# Patient Record
Sex: FEMALE | Race: AMERICAN INDIAN OR ALASKA NATIVE | NOT HISPANIC OR LATINO | Employment: FULL TIME | RURAL
[De-identification: names, ages, dates, MRNs, and addresses within clinical notes are randomized per-mention and may not be internally consistent; named-entity substitution may affect disease eponyms.]

---

## 2021-08-18 ENCOUNTER — HOSPITAL ENCOUNTER (INPATIENT)
Facility: HOSPITAL | Age: 41
LOS: 3 days | Discharge: ANOTHER HOSPITAL | DRG: 682 | End: 2021-08-21
Attending: INTERNAL MEDICINE | Admitting: INTERNAL MEDICINE
Payer: MEDICARE

## 2021-08-18 DIAGNOSIS — J81.1 PULMONARY EDEMA: ICD-10-CM

## 2021-08-18 DIAGNOSIS — R07.9 CHEST PAIN: ICD-10-CM

## 2021-08-18 DIAGNOSIS — I31.39 PERICARDIAL EFFUSION WITH CARDIAC TAMPONADE: Primary | ICD-10-CM

## 2021-08-18 DIAGNOSIS — E11.22 TYPE 2 DIABETES MELLITUS WITH STAGE 5 CHRONIC KIDNEY DISEASE NOT ON CHRONIC DIALYSIS, WITHOUT LONG-TERM CURRENT USE OF INSULIN: ICD-10-CM

## 2021-08-18 DIAGNOSIS — Z99.2 TYPE 2 DIABETES MELLITUS WITH CHRONIC KIDNEY DISEASE ON CHRONIC DIALYSIS, WITHOUT LONG-TERM CURRENT USE OF INSULIN: ICD-10-CM

## 2021-08-18 DIAGNOSIS — I31.39 PERICARDIAL EFFUSION: ICD-10-CM

## 2021-08-18 DIAGNOSIS — N18.5 TYPE 2 DIABETES MELLITUS WITH STAGE 5 CHRONIC KIDNEY DISEASE NOT ON CHRONIC DIALYSIS, WITHOUT LONG-TERM CURRENT USE OF INSULIN: ICD-10-CM

## 2021-08-18 DIAGNOSIS — E11.22 TYPE 2 DIABETES MELLITUS WITH CHRONIC KIDNEY DISEASE ON CHRONIC DIALYSIS, WITHOUT LONG-TERM CURRENT USE OF INSULIN: ICD-10-CM

## 2021-08-18 DIAGNOSIS — A41.9 SEPSIS DUE TO PNEUMONIA: ICD-10-CM

## 2021-08-18 DIAGNOSIS — R79.89 ELEVATED TROPONIN I LEVEL: ICD-10-CM

## 2021-08-18 DIAGNOSIS — I31.4 PERICARDIAL EFFUSION WITH CARDIAC TAMPONADE: Primary | ICD-10-CM

## 2021-08-18 DIAGNOSIS — N18.6 TYPE 2 DIABETES MELLITUS WITH CHRONIC KIDNEY DISEASE ON CHRONIC DIALYSIS, WITHOUT LONG-TERM CURRENT USE OF INSULIN: ICD-10-CM

## 2021-08-18 DIAGNOSIS — J18.9 SEPSIS DUE TO PNEUMONIA: ICD-10-CM

## 2021-08-18 LAB — TROPONIN I SERPL-MCNC: 0.37 NG/ML

## 2021-08-18 PROCEDURE — 36415 COLL VENOUS BLD VENIPUNCTURE: CPT | Performed by: INTERNAL MEDICINE

## 2021-08-18 PROCEDURE — 84484 ASSAY OF TROPONIN QUANT: CPT | Performed by: INTERNAL MEDICINE

## 2021-08-18 PROCEDURE — 11000001 HC ACUTE MED/SURG PRIVATE ROOM

## 2021-08-18 PROCEDURE — 87635 SARS-COV-2 COVID-19 AMP PRB: CPT | Performed by: INTERNAL MEDICINE

## 2021-08-18 PROCEDURE — 25000242 PHARM REV CODE 250 ALT 637 W/ HCPCS: Performed by: INTERNAL MEDICINE

## 2021-08-18 PROCEDURE — 25000003 PHARM REV CODE 250: Performed by: INTERNAL MEDICINE

## 2021-08-18 RX ORDER — NITROGLYCERIN 0.4 MG/1
0.4 TABLET SUBLINGUAL EVERY 5 MIN PRN
Status: DISCONTINUED | OUTPATIENT
Start: 2021-08-18 | End: 2021-08-21 | Stop reason: HOSPADM

## 2021-08-18 RX ORDER — LABETALOL HYDROCHLORIDE 5 MG/ML
20 INJECTION, SOLUTION INTRAVENOUS EVERY 4 HOURS PRN
Status: DISCONTINUED | OUTPATIENT
Start: 2021-08-18 | End: 2021-08-21 | Stop reason: HOSPADM

## 2021-08-18 RX ORDER — MORPHINE SULFATE 2 MG/ML
2 INJECTION, SOLUTION INTRAMUSCULAR; INTRAVENOUS
Status: DISCONTINUED | OUTPATIENT
Start: 2021-08-18 | End: 2021-08-19

## 2021-08-18 RX ADMIN — NITROGLYCERIN 0.4 MG: 0.4 TABLET, ORALLY DISINTEGRATING SUBLINGUAL at 11:08

## 2021-08-18 RX ADMIN — LABETALOL HYDROCHLORIDE 20 MG: 5 INJECTION, SOLUTION INTRAVENOUS at 11:08

## 2021-08-19 PROBLEM — I31.39 PERICARDIAL EFFUSION WITH CARDIAC TAMPONADE: Status: RESOLVED | Noted: 2021-08-19 | Resolved: 2021-08-19

## 2021-08-19 PROBLEM — N18.6 ESRD (END STAGE RENAL DISEASE): Status: ACTIVE | Noted: 2021-08-19

## 2021-08-19 PROBLEM — J18.9 CAP (COMMUNITY ACQUIRED PNEUMONIA): Status: RESOLVED | Noted: 2021-08-19 | Resolved: 2021-08-19

## 2021-08-19 PROBLEM — A41.9 SEPSIS DUE TO PNEUMONIA: Status: ACTIVE | Noted: 2021-08-19

## 2021-08-19 PROBLEM — I25.10 CORONARY ARTERY DISEASE INVOLVING NATIVE HEART: Status: ACTIVE | Noted: 2021-08-19

## 2021-08-19 PROBLEM — I31.4 PERICARDIAL EFFUSION WITH CARDIAC TAMPONADE: Status: RESOLVED | Noted: 2021-08-19 | Resolved: 2021-08-19

## 2021-08-19 PROBLEM — I10 ESSENTIAL HYPERTENSION: Status: ACTIVE | Noted: 2021-08-19

## 2021-08-19 PROBLEM — J18.9 CAP (COMMUNITY ACQUIRED PNEUMONIA): Status: ACTIVE | Noted: 2021-08-19

## 2021-08-19 PROBLEM — E11.9 TYPE 2 DIABETES MELLITUS, WITHOUT LONG-TERM CURRENT USE OF INSULIN: Status: ACTIVE | Noted: 2021-08-19

## 2021-08-19 PROBLEM — I31.39 PERICARDIAL EFFUSION WITH CARDIAC TAMPONADE: Status: ACTIVE | Noted: 2021-08-19

## 2021-08-19 PROBLEM — J18.9 SEPSIS DUE TO PNEUMONIA: Status: ACTIVE | Noted: 2021-08-19

## 2021-08-19 PROBLEM — A41.9 SEPSIS DUE TO PNEUMONIA: Status: RESOLVED | Noted: 2021-08-19 | Resolved: 2021-08-19

## 2021-08-19 PROBLEM — J96.01 ACUTE HYPOXEMIC RESPIRATORY FAILURE: Status: RESOLVED | Noted: 2021-08-19 | Resolved: 2021-08-19

## 2021-08-19 PROBLEM — J96.01 ACUTE HYPOXEMIC RESPIRATORY FAILURE: Status: ACTIVE | Noted: 2021-08-19

## 2021-08-19 PROBLEM — I31.4 PERICARDIAL EFFUSION WITH CARDIAC TAMPONADE: Status: ACTIVE | Noted: 2021-08-19

## 2021-08-19 PROBLEM — I27.20 PULMONARY HTN: Status: ACTIVE | Noted: 2021-08-19

## 2021-08-19 PROBLEM — J18.9 SEPSIS DUE TO PNEUMONIA: Status: RESOLVED | Noted: 2021-08-19 | Resolved: 2021-08-19

## 2021-08-19 PROBLEM — R79.89 ELEVATED TROPONIN I LEVEL: Status: ACTIVE | Noted: 2021-08-19

## 2021-08-19 PROBLEM — R07.1 CHEST PAIN ON BREATHING: Status: ACTIVE | Noted: 2021-08-19

## 2021-08-19 LAB
ALBUMIN SERPL BCP-MCNC: 2.9 G/DL (ref 3.5–5)
ALBUMIN SERPL BCP-MCNC: 3 G/DL (ref 3.5–5)
ALBUMIN/GLOB SERPL: 0.7 {RATIO}
ALP SERPL-CCNC: 132 U/L (ref 37–98)
ALT SERPL W P-5'-P-CCNC: 16 U/L (ref 13–56)
ANION GAP SERPL CALCULATED.3IONS-SCNC: 11 MMOL/L (ref 7–16)
ANION GAP SERPL CALCULATED.3IONS-SCNC: 12 MMOL/L (ref 7–16)
AORTIC ROOT ANNULUS: 2.2 CM
AORTIC VALVE CUSP SEPERATION: 1.63 CM
APTT PPP: 43 SECONDS (ref 25.2–37.3)
AST SERPL W P-5'-P-CCNC: 21 U/L (ref 15–37)
AV INDEX (PROSTH): 0.8
AV MEAN GRADIENT: 6 MMHG
AV PEAK GRADIENT: 12 MMHG
AV VALVE AREA: 1.81 CM2
AV VELOCITY RATIO: 0.88
BASOPHILS # BLD AUTO: 0.02 K/UL (ref 0–0.2)
BASOPHILS NFR BLD AUTO: 0.3 % (ref 0–1)
BILIRUB SERPL-MCNC: 0.8 MG/DL (ref 0–1.2)
BSA FOR ECHO PROCEDURE: 1.74 M2
BUN SERPL-MCNC: 20 MG/DL (ref 7–18)
BUN SERPL-MCNC: 20 MG/DL (ref 7–18)
BUN/CREAT SERPL: 3 (ref 6–20)
BUN/CREAT SERPL: 3 (ref 6–20)
CALCIUM SERPL-MCNC: 8.4 MG/DL (ref 8.5–10.1)
CALCIUM SERPL-MCNC: 8.4 MG/DL (ref 8.5–10.1)
CHLORIDE SERPL-SCNC: 95 MMOL/L (ref 98–107)
CHLORIDE SERPL-SCNC: 95 MMOL/L (ref 98–107)
CO2 SERPL-SCNC: 32 MMOL/L (ref 21–32)
CO2 SERPL-SCNC: 33 MMOL/L (ref 21–32)
CREAT SERPL-MCNC: 7.17 MG/DL (ref 0.55–1.02)
CREAT SERPL-MCNC: 7.19 MG/DL (ref 0.55–1.02)
CRP SERPL-MCNC: 1.5 MG/DL (ref 0–0.8)
CV ECHO LV RWT: 0.76 CM
DIFFERENTIAL METHOD BLD: ABNORMAL
DOP CALC AO PEAK VEL: 1.7 M/S
DOP CALC AO VTI: 35 CM
DOP CALC LVOT AREA: 2.3 CM2
DOP CALC LVOT DIAMETER: 1.7 CM
DOP CALC LVOT PEAK VEL: 1.5 M/S
DOP CALC LVOT STROKE VOLUME: 63.52 CM3
DOP CALCLVOT PEAK VEL VTI: 28 CM
E WAVE DECELERATION TIME: 213 MSEC
ECHO EF ESTIMATED: 55 %
ECHO LV POSTERIOR WALL: 1.79 CM (ref 0.6–1.1)
EJECTION FRACTION: 55 %
EOSINOPHIL # BLD AUTO: 0.18 K/UL (ref 0–0.5)
EOSINOPHIL NFR BLD AUTO: 2.7 % (ref 1–4)
ERYTHROCYTE [DISTWIDTH] IN BLOOD BY AUTOMATED COUNT: 15.4 % (ref 11.5–14.5)
EST. AVERAGE GLUCOSE BLD GHB EST-MCNC: 87 MG/DL
FRACTIONAL SHORTENING: 33 % (ref 28–44)
GLOBULIN SER-MCNC: 4.3 G/DL (ref 2–4)
GLUCOSE SERPL-MCNC: 107 MG/DL (ref 70–105)
GLUCOSE SERPL-MCNC: 92 MG/DL (ref 70–105)
GLUCOSE SERPL-MCNC: 98 MG/DL (ref 74–106)
GLUCOSE SERPL-MCNC: 99 MG/DL (ref 74–106)
HBA1C MFR BLD HPLC: 5.2 % (ref 4.5–6.6)
HCG SERUM QUALITATIVE: NEGATIVE
HCT VFR BLD AUTO: 28.4 % (ref 38–47)
HGB BLD-MCNC: 8.7 G/DL (ref 12–16)
IMM GRANULOCYTES # BLD AUTO: 0.1 K/UL (ref 0–0.04)
IMM GRANULOCYTES NFR BLD: 1.5 % (ref 0–0.4)
INR BLD: 0.96 (ref 0.9–1.1)
INTERVENTRICULAR SEPTUM: 1.76 CM (ref 0.6–1.1)
IVC OSTIUM: 1.6 CM
LACTATE SERPL-SCNC: 0.7 MMOL/L (ref 0.4–2)
LACTATE SERPL-SCNC: 0.8 MMOL/L (ref 0.4–2)
LEFT ATRIUM SIZE: 3.5 CM
LEFT INTERNAL DIMENSION IN SYSTOLE: 3.15 CM (ref 2.1–4)
LEFT VENTRICLE MASS INDEX: 225 G/M2
LEFT VENTRICULAR INTERNAL DIMENSION IN DIASTOLE: 4.69 CM (ref 3.5–6)
LEFT VENTRICULAR MASS: 380.25 G
LVOT MG: 5 MMHG
LYMPHOCYTES # BLD AUTO: 0.99 K/UL (ref 1–4.8)
LYMPHOCYTES NFR BLD AUTO: 14.6 % (ref 27–41)
MCH RBC QN AUTO: 30.6 PG (ref 27–31)
MCHC RBC AUTO-ENTMCNC: 30.6 G/DL (ref 32–36)
MCV RBC AUTO: 100 FL (ref 80–96)
METHICILLIN RESISTANT STAPHYLOCOCCUS AUREUS: NEGATIVE
MONOCYTES # BLD AUTO: 0.44 K/UL (ref 0–0.8)
MONOCYTES NFR BLD AUTO: 6.5 % (ref 2–6)
MPC BLD CALC-MCNC: 9.5 FL (ref 9.4–12.4)
MV PEAK E VEL: 1.31 M/S
NEUTROPHILS # BLD AUTO: 5.05 K/UL (ref 1.8–7.7)
NEUTROPHILS NFR BLD AUTO: 74.4 % (ref 53–65)
NRBC # BLD AUTO: 0 X10E3/UL
NRBC, AUTO (.00): 0 %
PHOSPHATE SERPL-MCNC: 5.3 MG/DL (ref 2.5–4.5)
PISA TR MAX VEL: 3.7 M/S
PLATELET # BLD AUTO: 223 K/UL (ref 150–400)
POTASSIUM SERPL-SCNC: 4.2 MMOL/L (ref 3.5–5.1)
POTASSIUM SERPL-SCNC: 4.2 MMOL/L (ref 3.5–5.1)
PROT SERPL-MCNC: 7.2 G/DL (ref 6.4–8.2)
PROTHROMBIN TIME: 12.8 SECONDS (ref 11.7–14.7)
RA MAJOR: 3.7 CM
RA PRESSURE: 3 MMHG
RBC # BLD AUTO: 2.84 M/UL (ref 4.2–5.4)
RIGHT VENTRICULAR END-DIASTOLIC DIMENSION: 4 CM
SARS-COV-2 RNA RESP QL NAA+PROBE: NEGATIVE
SODIUM SERPL-SCNC: 135 MMOL/L (ref 136–145)
SODIUM SERPL-SCNC: 135 MMOL/L (ref 136–145)
T4 SERPL-MCNC: 9.7 ΜG/DL (ref 4.8–13.9)
TR MAX PG: 55 MMHG
TRICUSPID ANNULAR PLANE SYSTOLIC EXCURSION: 2.2 CM
TROPONIN I SERPL-MCNC: 0.34 NG/ML
TROPONIN I SERPL-MCNC: 0.35 NG/ML
TSH SERPL DL<=0.005 MIU/L-ACNC: 5.31 UIU/ML (ref 0.36–3.74)
TV REST PULMONARY ARTERY PRESSURE: 58 MMHG
WBC # BLD AUTO: 6.78 K/UL (ref 4.5–11)

## 2021-08-19 PROCEDURE — 25000003 PHARM REV CODE 250: Performed by: HOSPITALIST

## 2021-08-19 PROCEDURE — 94761 N-INVAS EAR/PLS OXIMETRY MLT: CPT

## 2021-08-19 PROCEDURE — 87641 MR-STAPH DNA AMP PROBE: CPT | Performed by: INTERNAL MEDICINE

## 2021-08-19 PROCEDURE — 83605 ASSAY OF LACTIC ACID: CPT | Performed by: INTERNAL MEDICINE

## 2021-08-19 PROCEDURE — 93010 EKG 12-LEAD: ICD-10-PCS | Mod: ,,, | Performed by: HOSPITALIST

## 2021-08-19 PROCEDURE — 99223 PR INITIAL HOSPITAL CARE,LEVL III: ICD-10-PCS | Mod: ,,, | Performed by: INTERNAL MEDICINE

## 2021-08-19 PROCEDURE — 27000221 HC OXYGEN, UP TO 24 HOURS

## 2021-08-19 PROCEDURE — 84436 ASSAY OF TOTAL THYROXINE: CPT | Performed by: HOSPITALIST

## 2021-08-19 PROCEDURE — 99223 1ST HOSP IP/OBS HIGH 75: CPT | Mod: ,,, | Performed by: INTERNAL MEDICINE

## 2021-08-19 PROCEDURE — 80069 RENAL FUNCTION PANEL: CPT | Performed by: INTERNAL MEDICINE

## 2021-08-19 PROCEDURE — 83036 HEMOGLOBIN GLYCOSYLATED A1C: CPT | Performed by: INTERNAL MEDICINE

## 2021-08-19 PROCEDURE — 84484 ASSAY OF TROPONIN QUANT: CPT | Performed by: INTERNAL MEDICINE

## 2021-08-19 PROCEDURE — 80053 COMPREHEN METABOLIC PANEL: CPT | Performed by: INTERNAL MEDICINE

## 2021-08-19 PROCEDURE — 85730 THROMBOPLASTIN TIME PARTIAL: CPT | Performed by: NURSE PRACTITIONER

## 2021-08-19 PROCEDURE — 84443 ASSAY THYROID STIM HORMONE: CPT | Performed by: HOSPITALIST

## 2021-08-19 PROCEDURE — 84703 CHORIONIC GONADOTROPIN ASSAY: CPT | Performed by: INTERNAL MEDICINE

## 2021-08-19 PROCEDURE — 86140 C-REACTIVE PROTEIN: CPT | Performed by: HOSPITALIST

## 2021-08-19 PROCEDURE — 87040 BLOOD CULTURE FOR BACTERIA: CPT | Performed by: INTERNAL MEDICINE

## 2021-08-19 PROCEDURE — 93005 ELECTROCARDIOGRAM TRACING: CPT

## 2021-08-19 PROCEDURE — 85610 PROTHROMBIN TIME: CPT | Performed by: NURSE PRACTITIONER

## 2021-08-19 PROCEDURE — 36415 COLL VENOUS BLD VENIPUNCTURE: CPT | Performed by: INTERNAL MEDICINE

## 2021-08-19 PROCEDURE — 82962 GLUCOSE BLOOD TEST: CPT

## 2021-08-19 PROCEDURE — 85025 COMPLETE CBC W/AUTO DIFF WBC: CPT | Performed by: STUDENT IN AN ORGANIZED HEALTH CARE EDUCATION/TRAINING PROGRAM

## 2021-08-19 PROCEDURE — S0073 INJECTION, AZTREONAM, 500 MG: HCPCS | Performed by: INTERNAL MEDICINE

## 2021-08-19 PROCEDURE — 80100014 HC HEMODIALYSIS 1:1

## 2021-08-19 PROCEDURE — 93010 ELECTROCARDIOGRAM REPORT: CPT | Mod: ,,, | Performed by: HOSPITALIST

## 2021-08-19 PROCEDURE — 63600175 PHARM REV CODE 636 W HCPCS: Performed by: INTERNAL MEDICINE

## 2021-08-19 PROCEDURE — 25000003 PHARM REV CODE 250: Performed by: INTERNAL MEDICINE

## 2021-08-19 PROCEDURE — 11000001 HC ACUTE MED/SURG PRIVATE ROOM

## 2021-08-19 RX ORDER — ACETAMINOPHEN 325 MG/1
650 TABLET ORAL EVERY 6 HOURS PRN
Status: DISCONTINUED | OUTPATIENT
Start: 2021-08-19 | End: 2021-08-21 | Stop reason: HOSPADM

## 2021-08-19 RX ORDER — GLUCAGON 1 MG
1 KIT INJECTION
Status: DISCONTINUED | OUTPATIENT
Start: 2021-08-19 | End: 2021-08-21 | Stop reason: HOSPADM

## 2021-08-19 RX ORDER — ASPIRIN 81 MG/1
81 TABLET ORAL DAILY
Status: DISCONTINUED | OUTPATIENT
Start: 2021-08-19 | End: 2021-08-21 | Stop reason: HOSPADM

## 2021-08-19 RX ORDER — HYDRALAZINE HYDROCHLORIDE 25 MG/1
25 TABLET, FILM COATED ORAL EVERY 12 HOURS
Status: ON HOLD | COMMUNITY
End: 2021-08-21 | Stop reason: SDUPTHER

## 2021-08-19 RX ORDER — ACETAMINOPHEN 325 MG/1
650 TABLET ORAL EVERY 4 HOURS PRN
Status: DISCONTINUED | OUTPATIENT
Start: 2021-08-19 | End: 2021-08-21 | Stop reason: HOSPADM

## 2021-08-19 RX ORDER — HYDRALAZINE HYDROCHLORIDE 25 MG/1
25 TABLET, FILM COATED ORAL EVERY 12 HOURS
Status: DISCONTINUED | OUTPATIENT
Start: 2021-08-19 | End: 2021-08-21 | Stop reason: HOSPADM

## 2021-08-19 RX ORDER — MORPHINE SULFATE 2 MG/ML
2 INJECTION, SOLUTION INTRAMUSCULAR; INTRAVENOUS EVERY 4 HOURS PRN
Status: DISCONTINUED | OUTPATIENT
Start: 2021-08-19 | End: 2021-08-21 | Stop reason: HOSPADM

## 2021-08-19 RX ORDER — LOSARTAN POTASSIUM 50 MG/1
50 TABLET ORAL DAILY
COMMUNITY
End: 2024-02-16

## 2021-08-19 RX ORDER — SODIUM CHLORIDE 0.9 % (FLUSH) 0.9 %
10 SYRINGE (ML) INJECTION
Status: DISCONTINUED | OUTPATIENT
Start: 2021-08-19 | End: 2021-08-21 | Stop reason: HOSPADM

## 2021-08-19 RX ORDER — IBUPROFEN 200 MG
24 TABLET ORAL
Status: DISCONTINUED | OUTPATIENT
Start: 2021-08-19 | End: 2021-08-19 | Stop reason: RX

## 2021-08-19 RX ORDER — HEPARIN SODIUM 5000 [USP'U]/ML
5000 INJECTION, SOLUTION INTRAVENOUS; SUBCUTANEOUS EVERY 12 HOURS
Status: DISCONTINUED | OUTPATIENT
Start: 2021-08-19 | End: 2021-08-21 | Stop reason: HOSPADM

## 2021-08-19 RX ORDER — COLCHICINE 0.6 MG/1
0.6 TABLET, FILM COATED ORAL 2 TIMES DAILY
Status: DISCONTINUED | OUTPATIENT
Start: 2021-08-19 | End: 2021-08-19

## 2021-08-19 RX ORDER — IBUPROFEN 200 MG
16 TABLET ORAL
Status: DISCONTINUED | OUTPATIENT
Start: 2021-08-19 | End: 2021-08-19 | Stop reason: RX

## 2021-08-19 RX ORDER — HYDROCODONE BITARTRATE AND ACETAMINOPHEN 5; 325 MG/1; MG/1
1 TABLET ORAL EVERY 6 HOURS PRN
Status: DISCONTINUED | OUTPATIENT
Start: 2021-08-19 | End: 2021-08-21 | Stop reason: HOSPADM

## 2021-08-19 RX ORDER — AMLODIPINE BESYLATE 10 MG/1
10 TABLET ORAL DAILY
Status: DISCONTINUED | OUTPATIENT
Start: 2021-08-19 | End: 2021-08-21 | Stop reason: HOSPADM

## 2021-08-19 RX ORDER — SODIUM CHLORIDE 9 MG/ML
INJECTION, SOLUTION INTRAVENOUS
Status: DISPENSED
Start: 2021-08-19 | End: 2021-08-20

## 2021-08-19 RX ORDER — LOSARTAN POTASSIUM 50 MG/1
50 TABLET ORAL DAILY
Status: DISCONTINUED | OUTPATIENT
Start: 2021-08-19 | End: 2021-08-21 | Stop reason: HOSPADM

## 2021-08-19 RX ORDER — INSULIN ASPART 100 [IU]/ML
0-5 INJECTION, SOLUTION INTRAVENOUS; SUBCUTANEOUS
Status: DISCONTINUED | OUTPATIENT
Start: 2021-08-19 | End: 2021-08-21 | Stop reason: HOSPADM

## 2021-08-19 RX ORDER — GABAPENTIN 100 MG/1
100 CAPSULE ORAL 2 TIMES DAILY
Status: ON HOLD | COMMUNITY
End: 2021-08-21 | Stop reason: SDUPTHER

## 2021-08-19 RX ORDER — CLONIDINE HYDROCHLORIDE 0.2 MG/1
0.2 TABLET ORAL 2 TIMES DAILY
COMMUNITY

## 2021-08-19 RX ORDER — AMLODIPINE BESYLATE 10 MG/1
10 TABLET ORAL DAILY
COMMUNITY

## 2021-08-19 RX ORDER — IBUPROFEN 600 MG/1
600 TABLET ORAL EVERY 8 HOURS
Status: DISCONTINUED | OUTPATIENT
Start: 2021-08-19 | End: 2021-08-21 | Stop reason: HOSPADM

## 2021-08-19 RX ORDER — METOPROLOL TARTRATE 25 MG/1
25 TABLET, FILM COATED ORAL 2 TIMES DAILY
Status: DISCONTINUED | OUTPATIENT
Start: 2021-08-19 | End: 2021-08-21 | Stop reason: HOSPADM

## 2021-08-19 RX ORDER — ATORVASTATIN CALCIUM 40 MG/1
40 TABLET, FILM COATED ORAL NIGHTLY
Status: DISCONTINUED | OUTPATIENT
Start: 2021-08-19 | End: 2021-08-21 | Stop reason: HOSPADM

## 2021-08-19 RX ADMIN — ATORVASTATIN CALCIUM 40 MG: 40 TABLET, FILM COATED ORAL at 08:08

## 2021-08-19 RX ADMIN — METOPROLOL TARTRATE 25 MG: 25 TABLET, FILM COATED ORAL at 08:08

## 2021-08-19 RX ADMIN — VANCOMYCIN HYDROCHLORIDE 1500 MG: 5 INJECTION, POWDER, LYOPHILIZED, FOR SOLUTION INTRAVENOUS at 05:08

## 2021-08-19 RX ADMIN — ACETAMINOPHEN 650 MG: 325 TABLET ORAL at 02:08

## 2021-08-19 RX ADMIN — HEPARIN SODIUM 5000 UNITS: 5000 INJECTION INTRAVENOUS; SUBCUTANEOUS at 09:08

## 2021-08-19 RX ADMIN — LOSARTAN POTASSIUM 50 MG: 50 TABLET, FILM COATED ORAL at 12:08

## 2021-08-19 RX ADMIN — LABETALOL HYDROCHLORIDE 20 MG: 5 INJECTION, SOLUTION INTRAVENOUS at 08:08

## 2021-08-19 RX ADMIN — ATORVASTATIN CALCIUM 40 MG: 40 TABLET, FILM COATED ORAL at 04:08

## 2021-08-19 RX ADMIN — IBUPROFEN 600 MG: 600 TABLET, FILM COATED ORAL at 02:08

## 2021-08-19 RX ADMIN — AZTREONAM 2000 MG: 2 INJECTION, POWDER, LYOPHILIZED, FOR SOLUTION INTRAMUSCULAR; INTRAVENOUS at 12:08

## 2021-08-19 RX ADMIN — NITROGLYCERIN 0.4 MG: 0.4 TABLET, ORALLY DISINTEGRATING SUBLINGUAL at 09:08

## 2021-08-19 RX ADMIN — HYDROCODONE BITARTRATE AND ACETAMINOPHEN 1 TABLET: 5; 325 TABLET ORAL at 08:08

## 2021-08-19 RX ADMIN — IBUPROFEN 600 MG: 600 TABLET, FILM COATED ORAL at 09:08

## 2021-08-19 RX ADMIN — COLCHICINE 0.6 MG: 0.6 TABLET, FILM COATED ORAL at 12:08

## 2021-08-19 RX ADMIN — MORPHINE SULFATE 2 MG: 2 INJECTION, SOLUTION INTRAMUSCULAR; INTRAVENOUS at 09:08

## 2021-08-19 RX ADMIN — AZTREONAM 2000 MG: 2 INJECTION, POWDER, LYOPHILIZED, FOR SOLUTION INTRAMUSCULAR; INTRAVENOUS at 04:08

## 2021-08-19 RX ADMIN — HYDRALAZINE HYDROCHLORIDE 25 MG: 25 TABLET ORAL at 08:08

## 2021-08-19 RX ADMIN — HEPARIN SODIUM 5000 UNITS: 5000 INJECTION INTRAVENOUS; SUBCUTANEOUS at 08:08

## 2021-08-19 RX ADMIN — AMLODIPINE BESYLATE 10 MG: 10 TABLET ORAL at 12:08

## 2021-08-19 RX ADMIN — ASPIRIN 81 MG: 81 TABLET, COATED ORAL at 08:08

## 2021-08-19 RX ADMIN — TOBRAMYCIN SULFATE 280 MG: 40 INJECTION, SOLUTION INTRAMUSCULAR; INTRAVENOUS at 06:08

## 2021-08-19 RX ADMIN — LABETALOL HYDROCHLORIDE 20 MG: 5 INJECTION, SOLUTION INTRAVENOUS at 11:08

## 2021-08-19 RX ADMIN — MORPHINE SULFATE 2 MG: 2 INJECTION, SOLUTION INTRAMUSCULAR; INTRAVENOUS at 01:08

## 2021-08-20 LAB
ALBUMIN SERPL BCP-MCNC: 2.7 G/DL (ref 3.5–5)
ALBUMIN SERPL BCP-MCNC: 2.7 G/DL (ref 3.5–5)
ALBUMIN/GLOB SERPL: 0.6 {RATIO}
ALP SERPL-CCNC: 127 U/L (ref 37–98)
ALT SERPL W P-5'-P-CCNC: 21 U/L (ref 13–56)
ANION GAP SERPL CALCULATED.3IONS-SCNC: 14 MMOL/L (ref 7–16)
ANION GAP SERPL CALCULATED.3IONS-SCNC: 14 MMOL/L (ref 7–16)
AST SERPL W P-5'-P-CCNC: 35 U/L (ref 15–37)
BASOPHILS # BLD AUTO: 0.04 K/UL (ref 0–0.2)
BASOPHILS NFR BLD AUTO: 0.7 % (ref 0–1)
BILIRUB SERPL-MCNC: 0.7 MG/DL (ref 0–1.2)
BUN SERPL-MCNC: 13 MG/DL (ref 7–18)
BUN SERPL-MCNC: 13 MG/DL (ref 7–18)
BUN/CREAT SERPL: 3 (ref 6–20)
BUN/CREAT SERPL: 3 (ref 6–20)
CALCIUM SERPL-MCNC: 8.2 MG/DL (ref 8.5–10.1)
CALCIUM SERPL-MCNC: 8.2 MG/DL (ref 8.5–10.1)
CHLORIDE SERPL-SCNC: 99 MMOL/L (ref 98–107)
CHLORIDE SERPL-SCNC: 99 MMOL/L (ref 98–107)
CO2 SERPL-SCNC: 27 MMOL/L (ref 21–32)
CO2 SERPL-SCNC: 27 MMOL/L (ref 21–32)
CREAT SERPL-MCNC: 5.02 MG/DL (ref 0.55–1.02)
CREAT SERPL-MCNC: 5.02 MG/DL (ref 0.55–1.02)
DIFFERENTIAL METHOD BLD: ABNORMAL
EOSINOPHIL # BLD AUTO: 0.14 K/UL (ref 0–0.5)
EOSINOPHIL NFR BLD AUTO: 2.4 % (ref 1–4)
ERYTHROCYTE [DISTWIDTH] IN BLOOD BY AUTOMATED COUNT: 15.2 % (ref 11.5–14.5)
GLOBULIN SER-MCNC: 4.2 G/DL (ref 2–4)
GLUCOSE SERPL-MCNC: 113 MG/DL (ref 70–105)
GLUCOSE SERPL-MCNC: 129 MG/DL (ref 70–105)
GLUCOSE SERPL-MCNC: 158 MG/DL (ref 70–105)
GLUCOSE SERPL-MCNC: 94 MG/DL (ref 74–106)
GLUCOSE SERPL-MCNC: 94 MG/DL (ref 74–106)
HCT VFR BLD AUTO: 28.3 % (ref 38–47)
HGB BLD-MCNC: 8.4 G/DL (ref 12–16)
IMM GRANULOCYTES # BLD AUTO: 0.16 K/UL (ref 0–0.04)
IMM GRANULOCYTES NFR BLD: 2.7 % (ref 0–0.4)
LYMPHOCYTES # BLD AUTO: 0.95 K/UL (ref 1–4.8)
LYMPHOCYTES NFR BLD AUTO: 16 % (ref 27–41)
MAGNESIUM SERPL-MCNC: 2.7 MG/DL (ref 1.7–2.3)
MCH RBC QN AUTO: 30.2 PG (ref 27–31)
MCHC RBC AUTO-ENTMCNC: 29.7 G/DL (ref 32–36)
MCV RBC AUTO: 101.8 FL (ref 80–96)
MONOCYTES # BLD AUTO: 0.46 K/UL (ref 0–0.8)
MONOCYTES NFR BLD AUTO: 7.8 % (ref 2–6)
MPC BLD CALC-MCNC: 9.5 FL (ref 9.4–12.4)
NEUTROPHILS # BLD AUTO: 4.18 K/UL (ref 1.8–7.7)
NEUTROPHILS NFR BLD AUTO: 70.4 % (ref 53–65)
NRBC # BLD AUTO: 0 X10E3/UL
NRBC, AUTO (.00): 0 %
PHOSPHATE SERPL-MCNC: 5.3 MG/DL (ref 2.5–4.5)
PLATELET # BLD AUTO: 218 K/UL (ref 150–400)
POTASSIUM SERPL-SCNC: 4.3 MMOL/L (ref 3.5–5.1)
POTASSIUM SERPL-SCNC: 4.3 MMOL/L (ref 3.5–5.1)
PROT SERPL-MCNC: 6.9 G/DL (ref 6.4–8.2)
RBC # BLD AUTO: 2.78 M/UL (ref 4.2–5.4)
SODIUM SERPL-SCNC: 136 MMOL/L (ref 136–145)
SODIUM SERPL-SCNC: 136 MMOL/L (ref 136–145)
TOBRAMYCIN SERPL-MCNC: 4.3 ΜG/ML (ref 0–1.9)
VANCOMYCIN SERPL-MCNC: 27.5 ΜG/ML (ref 0–20)
WBC # BLD AUTO: 5.93 K/UL (ref 4.5–11)

## 2021-08-20 PROCEDURE — 25000003 PHARM REV CODE 250: Performed by: INTERNAL MEDICINE

## 2021-08-20 PROCEDURE — S0073 INJECTION, AZTREONAM, 500 MG: HCPCS | Performed by: HOSPITALIST

## 2021-08-20 PROCEDURE — 82962 GLUCOSE BLOOD TEST: CPT

## 2021-08-20 PROCEDURE — 25000003 PHARM REV CODE 250: Performed by: HOSPITALIST

## 2021-08-20 PROCEDURE — 27000221 HC OXYGEN, UP TO 24 HOURS

## 2021-08-20 PROCEDURE — 36415 PR COLLECTION VENOUS BLOOD,VENIPUNCTURE: ICD-10-PCS | Mod: ,,, | Performed by: CLINICAL MEDICAL LABORATORY

## 2021-08-20 PROCEDURE — 63600175 PHARM REV CODE 636 W HCPCS: Performed by: INTERNAL MEDICINE

## 2021-08-20 PROCEDURE — 36415 COLL VENOUS BLD VENIPUNCTURE: CPT | Performed by: HOSPITALIST

## 2021-08-20 PROCEDURE — 11000001 HC ACUTE MED/SURG PRIVATE ROOM

## 2021-08-20 PROCEDURE — 36415 COLL VENOUS BLD VENIPUNCTURE: CPT | Mod: ,,, | Performed by: CLINICAL MEDICAL LABORATORY

## 2021-08-20 PROCEDURE — 80200 ASSAY OF TOBRAMYCIN: CPT | Performed by: HOSPITALIST

## 2021-08-20 PROCEDURE — 80202 ASSAY OF VANCOMYCIN: CPT | Performed by: HOSPITALIST

## 2021-08-20 PROCEDURE — 85025 COMPLETE CBC W/AUTO DIFF WBC: CPT | Performed by: INTERNAL MEDICINE

## 2021-08-20 PROCEDURE — 80053 COMPREHEN METABOLIC PANEL: CPT | Performed by: INTERNAL MEDICINE

## 2021-08-20 PROCEDURE — 80069 RENAL FUNCTION PANEL: CPT | Performed by: INTERNAL MEDICINE

## 2021-08-20 PROCEDURE — 83735 ASSAY OF MAGNESIUM: CPT | Performed by: INTERNAL MEDICINE

## 2021-08-20 RX ORDER — HYDRALAZINE HYDROCHLORIDE 20 MG/ML
20 INJECTION INTRAMUSCULAR; INTRAVENOUS EVERY 8 HOURS PRN
Status: DISCONTINUED | OUTPATIENT
Start: 2021-08-20 | End: 2021-08-21 | Stop reason: HOSPADM

## 2021-08-20 RX ORDER — GUAIFENESIN/DEXTROMETHORPHAN 100-10MG/5
10 SYRUP ORAL EVERY 4 HOURS PRN
Status: DISCONTINUED | OUTPATIENT
Start: 2021-08-20 | End: 2021-08-21 | Stop reason: HOSPADM

## 2021-08-20 RX ADMIN — HEPARIN SODIUM 5000 UNITS: 5000 INJECTION INTRAVENOUS; SUBCUTANEOUS at 09:08

## 2021-08-20 RX ADMIN — HYDRALAZINE HYDROCHLORIDE 25 MG: 25 TABLET ORAL at 08:08

## 2021-08-20 RX ADMIN — HEPARIN SODIUM 5000 UNITS: 5000 INJECTION INTRAVENOUS; SUBCUTANEOUS at 08:08

## 2021-08-20 RX ADMIN — ASPIRIN 81 MG: 81 TABLET, COATED ORAL at 08:08

## 2021-08-20 RX ADMIN — METOPROLOL TARTRATE 25 MG: 25 TABLET, FILM COATED ORAL at 08:08

## 2021-08-20 RX ADMIN — HYDROCODONE BITARTRATE AND ACETAMINOPHEN 1 TABLET: 5; 325 TABLET ORAL at 08:08

## 2021-08-20 RX ADMIN — LOSARTAN POTASSIUM 50 MG: 50 TABLET, FILM COATED ORAL at 08:08

## 2021-08-20 RX ADMIN — IBUPROFEN 600 MG: 600 TABLET, FILM COATED ORAL at 05:08

## 2021-08-20 RX ADMIN — AMLODIPINE BESYLATE 10 MG: 10 TABLET ORAL at 08:08

## 2021-08-20 RX ADMIN — AZTREONAM 2000 MG: 1 INJECTION, POWDER, LYOPHILIZED, FOR SOLUTION INTRAMUSCULAR; INTRAVENOUS at 01:08

## 2021-08-20 RX ADMIN — ATORVASTATIN CALCIUM 40 MG: 40 TABLET, FILM COATED ORAL at 08:08

## 2021-08-20 RX ADMIN — IBUPROFEN 600 MG: 600 TABLET, FILM COATED ORAL at 08:08

## 2021-08-20 RX ADMIN — HYDRALAZINE HYDROCHLORIDE 20 MG: 20 INJECTION INTRAMUSCULAR; INTRAVENOUS at 10:08

## 2021-08-20 RX ADMIN — HYDRALAZINE HYDROCHLORIDE 20 MG: 20 INJECTION INTRAMUSCULAR; INTRAVENOUS at 02:08

## 2021-08-20 RX ADMIN — GUAIFENESIN AND DEXTROMETHORPHAN 10 ML: 100; 10 SYRUP ORAL at 08:08

## 2021-08-20 RX ADMIN — IBUPROFEN 600 MG: 600 TABLET, FILM COATED ORAL at 01:08

## 2021-08-20 RX ADMIN — GUAIFENESIN AND DEXTROMETHORPHAN 10 ML: 100; 10 SYRUP ORAL at 10:08

## 2021-08-21 VITALS
WEIGHT: 158.31 LBS | RESPIRATION RATE: 18 BRPM | SYSTOLIC BLOOD PRESSURE: 206 MMHG | HEIGHT: 60 IN | TEMPERATURE: 98 F | BODY MASS INDEX: 31.08 KG/M2 | OXYGEN SATURATION: 96 % | HEART RATE: 66 BPM | DIASTOLIC BLOOD PRESSURE: 86 MMHG

## 2021-08-21 PROBLEM — R79.89 ELEVATED TROPONIN I LEVEL: Status: RESOLVED | Noted: 2021-08-19 | Resolved: 2021-08-21

## 2021-08-21 LAB
ALBUMIN SERPL BCP-MCNC: 2.8 G/DL (ref 3.5–5)
ALBUMIN SERPL BCP-MCNC: 2.8 G/DL (ref 3.5–5)
ALBUMIN/GLOB SERPL: 0.7 {RATIO}
ALP SERPL-CCNC: 141 U/L (ref 37–98)
ALT SERPL W P-5'-P-CCNC: 17 U/L (ref 13–56)
ANION GAP SERPL CALCULATED.3IONS-SCNC: 14 MMOL/L (ref 7–16)
ANION GAP SERPL CALCULATED.3IONS-SCNC: 14 MMOL/L (ref 7–16)
AST SERPL W P-5'-P-CCNC: 27 U/L (ref 15–37)
BASOPHILS # BLD AUTO: 0.03 K/UL (ref 0–0.2)
BASOPHILS NFR BLD AUTO: 0.4 % (ref 0–1)
BILIRUB SERPL-MCNC: 0.6 MG/DL (ref 0–1.2)
BUN SERPL-MCNC: 22 MG/DL (ref 7–18)
BUN SERPL-MCNC: 22 MG/DL (ref 7–18)
BUN/CREAT SERPL: 3 (ref 6–20)
BUN/CREAT SERPL: 3 (ref 6–20)
CALCIUM SERPL-MCNC: 8.3 MG/DL (ref 8.5–10.1)
CALCIUM SERPL-MCNC: 8.3 MG/DL (ref 8.5–10.1)
CHLORIDE SERPL-SCNC: 99 MMOL/L (ref 98–107)
CHLORIDE SERPL-SCNC: 99 MMOL/L (ref 98–107)
CO2 SERPL-SCNC: 30 MMOL/L (ref 21–32)
CO2 SERPL-SCNC: 30 MMOL/L (ref 21–32)
CREAT SERPL-MCNC: 7.01 MG/DL (ref 0.55–1.02)
CREAT SERPL-MCNC: 7.01 MG/DL (ref 0.55–1.02)
DIFFERENTIAL METHOD BLD: ABNORMAL
EOSINOPHIL # BLD AUTO: 0.16 K/UL (ref 0–0.5)
EOSINOPHIL NFR BLD AUTO: 2 % (ref 1–4)
ERYTHROCYTE [DISTWIDTH] IN BLOOD BY AUTOMATED COUNT: 15.2 % (ref 11.5–14.5)
FOLATE SERPL-MCNC: 3.5 NG/ML (ref 3.1–17.5)
GLOBULIN SER-MCNC: 4.1 G/DL (ref 2–4)
GLUCOSE SERPL-MCNC: 102 MG/DL (ref 70–105)
GLUCOSE SERPL-MCNC: 104 MG/DL (ref 74–106)
GLUCOSE SERPL-MCNC: 104 MG/DL (ref 74–106)
GLUCOSE SERPL-MCNC: 119 MG/DL (ref 70–105)
HCT VFR BLD AUTO: 27.6 % (ref 38–47)
HGB BLD-MCNC: 8.3 G/DL (ref 12–16)
IMM GRANULOCYTES # BLD AUTO: 0.17 K/UL (ref 0–0.04)
IMM GRANULOCYTES NFR BLD: 2.1 % (ref 0–0.4)
LYMPHOCYTES # BLD AUTO: 1.61 K/UL (ref 1–4.8)
LYMPHOCYTES NFR BLD AUTO: 19.9 % (ref 27–41)
MAGNESIUM SERPL-MCNC: 2.5 MG/DL (ref 1.7–2.3)
MCH RBC QN AUTO: 30.2 PG (ref 27–31)
MCHC RBC AUTO-ENTMCNC: 30.1 G/DL (ref 32–36)
MCV RBC AUTO: 100.4 FL (ref 80–96)
MONOCYTES # BLD AUTO: 0.45 K/UL (ref 0–0.8)
MONOCYTES NFR BLD AUTO: 5.5 % (ref 2–6)
MPC BLD CALC-MCNC: 9.5 FL (ref 9.4–12.4)
NEUTROPHILS # BLD AUTO: 5.69 K/UL (ref 1.8–7.7)
NEUTROPHILS NFR BLD AUTO: 70.1 % (ref 53–65)
NRBC # BLD AUTO: 0.02 X10E3/UL
NRBC, AUTO (.00): 0.2 %
PHOSPHATE SERPL-MCNC: 5.8 MG/DL (ref 2.5–4.5)
PLATELET # BLD AUTO: 221 K/UL (ref 150–400)
POTASSIUM SERPL-SCNC: 5.3 MMOL/L (ref 3.5–5.1)
POTASSIUM SERPL-SCNC: 5.3 MMOL/L (ref 3.5–5.1)
PROT SERPL-MCNC: 6.9 G/DL (ref 6.4–8.2)
RBC # BLD AUTO: 2.75 M/UL (ref 4.2–5.4)
SODIUM SERPL-SCNC: 138 MMOL/L (ref 136–145)
SODIUM SERPL-SCNC: 138 MMOL/L (ref 136–145)
VIT B12 SERPL-MCNC: 491 PG/ML (ref 193–986)
WBC # BLD AUTO: 8.11 K/UL (ref 4.5–11)

## 2021-08-21 PROCEDURE — 94761 N-INVAS EAR/PLS OXIMETRY MLT: CPT

## 2021-08-21 PROCEDURE — 80053 COMPREHEN METABOLIC PANEL: CPT | Performed by: INTERNAL MEDICINE

## 2021-08-21 PROCEDURE — 25000003 PHARM REV CODE 250: Performed by: HOSPITALIST

## 2021-08-21 PROCEDURE — 80069 RENAL FUNCTION PANEL: CPT | Performed by: INTERNAL MEDICINE

## 2021-08-21 PROCEDURE — 86334 PROTEIN ELECTROPHORESIS, SERUM WITH REFLEX IFE: ICD-10-PCS | Mod: ,,, | Performed by: CLINICAL MEDICAL LABORATORY

## 2021-08-21 PROCEDURE — 25000003 PHARM REV CODE 250: Performed by: INTERNAL MEDICINE

## 2021-08-21 PROCEDURE — 86334 IMMUNOFIX E-PHORESIS SERUM: CPT | Performed by: HOSPITALIST

## 2021-08-21 PROCEDURE — 86334 IMMUNOFIX E-PHORESIS SERUM: CPT | Mod: ,,, | Performed by: CLINICAL MEDICAL LABORATORY

## 2021-08-21 PROCEDURE — 85025 COMPLETE CBC W/AUTO DIFF WBC: CPT | Performed by: INTERNAL MEDICINE

## 2021-08-21 PROCEDURE — 63600175 PHARM REV CODE 636 W HCPCS: Performed by: INTERNAL MEDICINE

## 2021-08-21 PROCEDURE — 82607 VITAMIN B-12: CPT | Performed by: HOSPITALIST

## 2021-08-21 PROCEDURE — 83735 ASSAY OF MAGNESIUM: CPT | Performed by: INTERNAL MEDICINE

## 2021-08-21 PROCEDURE — 36415 COLL VENOUS BLD VENIPUNCTURE: CPT | Performed by: INTERNAL MEDICINE

## 2021-08-21 PROCEDURE — 82962 GLUCOSE BLOOD TEST: CPT

## 2021-08-21 RX ORDER — HYDRALAZINE HYDROCHLORIDE 25 MG/1
50 TABLET, FILM COATED ORAL EVERY 8 HOURS
Start: 2021-08-21 | End: 2024-02-16 | Stop reason: DRUGHIGH

## 2021-08-21 RX ORDER — ATORVASTATIN CALCIUM 40 MG/1
40 TABLET, FILM COATED ORAL NIGHTLY
Qty: 90 TABLET | Refills: 3
Start: 2021-08-21 | End: 2024-02-16

## 2021-08-21 RX ORDER — IBUPROFEN 600 MG/1
600 TABLET ORAL EVERY 8 HOURS
Start: 2021-08-21 | End: 2024-02-16 | Stop reason: ALTCHOICE

## 2021-08-21 RX ORDER — GABAPENTIN 100 MG/1
100 CAPSULE ORAL NIGHTLY
Start: 2021-08-21 | End: 2024-02-16 | Stop reason: DRUGHIGH

## 2021-08-21 RX ORDER — ASPIRIN 81 MG/1
81 TABLET ORAL DAILY
Refills: 0
Start: 2021-08-22 | End: 2024-02-16

## 2021-08-21 RX ORDER — METOPROLOL TARTRATE 25 MG/1
25 TABLET, FILM COATED ORAL 2 TIMES DAILY
Qty: 60 TABLET | Refills: 11
Start: 2021-08-21 | End: 2024-02-16

## 2021-08-21 RX ADMIN — GUAIFENESIN AND DEXTROMETHORPHAN 10 ML: 100; 10 SYRUP ORAL at 12:08

## 2021-08-21 RX ADMIN — GUAIFENESIN AND DEXTROMETHORPHAN 10 ML: 100; 10 SYRUP ORAL at 06:08

## 2021-08-21 RX ADMIN — HYDRALAZINE HYDROCHLORIDE 25 MG: 25 TABLET ORAL at 09:08

## 2021-08-21 RX ADMIN — MORPHINE SULFATE 2 MG: 2 INJECTION, SOLUTION INTRAMUSCULAR; INTRAVENOUS at 01:08

## 2021-08-21 RX ADMIN — IBUPROFEN 600 MG: 600 TABLET, FILM COATED ORAL at 06:08

## 2021-08-21 RX ADMIN — HEPARIN SODIUM 5000 UNITS: 5000 INJECTION INTRAVENOUS; SUBCUTANEOUS at 09:08

## 2021-08-21 RX ADMIN — AMLODIPINE BESYLATE 10 MG: 10 TABLET ORAL at 09:08

## 2021-08-21 RX ADMIN — METOPROLOL TARTRATE 25 MG: 25 TABLET, FILM COATED ORAL at 09:08

## 2021-08-21 RX ADMIN — ASPIRIN 81 MG: 81 TABLET, COATED ORAL at 09:08

## 2021-08-21 RX ADMIN — LOSARTAN POTASSIUM 50 MG: 50 TABLET, FILM COATED ORAL at 09:08

## 2021-08-23 LAB
ALBUMIN PE, BLOOD: 3.81 G/DL (ref 3.5–5.2)
ALPHA1 GLOB SERPL ELPH-MCNC: 0.3 G/DL (ref 0.1–0.4)
ALPHA2 GLOB SERPL ELPH-MCNC: 0.7 G/DL (ref 0.4–1.3)
B-GLOBULIN SERPL ELPH-MCNC: 0.5 G/DL (ref 0.5–1.5)
GAMMA GLOB SERPL ELPH-MCNC: 1.2 G/DL (ref 0.5–1.8)
PATH INTERP BLD-IMP: NORMAL
PROT SERPL-MCNC: 6.5 G/DL (ref 6.4–8.2)

## 2021-08-24 LAB
BACTERIA BLD CULT: NORMAL
BACTERIA BLD CULT: NORMAL

## 2023-08-02 DIAGNOSIS — S92.919A: Primary | ICD-10-CM

## 2023-08-17 ENCOUNTER — TELEPHONE (OUTPATIENT)
Dept: ORTHOPEDICS | Facility: CLINIC | Age: 43
End: 2023-08-17
Payer: MEDICARE

## 2024-02-14 ENCOUNTER — HOSPITAL ENCOUNTER (EMERGENCY)
Facility: HOSPITAL | Age: 44
Discharge: HOME | End: 2024-02-14
Payer: MEDICARE

## 2024-02-14 VITALS
TEMPERATURE: 98 F | SYSTOLIC BLOOD PRESSURE: 194 MMHG | HEART RATE: 60 BPM | OXYGEN SATURATION: 96 % | BODY MASS INDEX: 27.29 KG/M2 | HEIGHT: 60 IN | DIASTOLIC BLOOD PRESSURE: 83 MMHG | RESPIRATION RATE: 18 BRPM | WEIGHT: 139 LBS

## 2024-02-14 DIAGNOSIS — L03.90 CELLULITIS, UNSPECIFIED CELLULITIS SITE: Primary | ICD-10-CM

## 2024-02-14 PROCEDURE — 99284 EMERGENCY DEPT VISIT MOD MDM: CPT | Mod: ,,, | Performed by: NURSE PRACTITIONER

## 2024-02-14 PROCEDURE — 96365 THER/PROPH/DIAG IV INF INIT: CPT

## 2024-02-14 PROCEDURE — 99284 EMERGENCY DEPT VISIT MOD MDM: CPT | Mod: 25

## 2024-02-14 PROCEDURE — 63600175 PHARM REV CODE 636 W HCPCS: Mod: JZ,JG | Performed by: NURSE PRACTITIONER

## 2024-02-14 RX ORDER — CLINDAMYCIN HYDROCHLORIDE 150 MG/1
300 CAPSULE ORAL 4 TIMES DAILY
Qty: 56 CAPSULE | Refills: 0 | Status: ON HOLD | OUTPATIENT
Start: 2024-02-14 | End: 2024-02-17 | Stop reason: HOSPADM

## 2024-02-14 RX ORDER — CLINDAMYCIN PHOSPHATE 600 MG/50ML
600 INJECTION, SOLUTION INTRAVENOUS
Status: COMPLETED | OUTPATIENT
Start: 2024-02-14 | End: 2024-02-14

## 2024-02-14 RX ADMIN — CLINDAMYCIN PHOSPHATE 600 MG: 600 INJECTION, SOLUTION INTRAVENOUS at 06:02

## 2024-02-14 NOTE — ED PROVIDER NOTES
Encounter Date: 2/14/2024       History     Chief Complaint   Patient presents with    Wound Infection     44-year-old female presents to ED with complaint of wound infection.  Patient states she was bit on her right ankle on Friday by a spider.  Patient states areas small initially with worsening of the area over the course of several days.  Patient was seen at wound care clinic in [place] and was transferred to ED for further evaluation/IV antibiotic therapy.  Patient denies recent antibiotic treatment for the area.  Patient reports increased swelling and pain to right lower extremity.  Denies chest pain, shortness of breath, fever, chills, nausea/vomiting, diarrhea.    The history is provided by the patient and medical records.     Review of patient's allergies indicates:   Allergen Reactions    Pseudoephedrine Shortness Of Breath and Other (See Comments)    Ampicillin     Penicillins     Sudafed cold-allergy      Past Medical History:   Diagnosis Date    Diabetes mellitus     Dialysis patient     Hypertension     Nerve pain     Renal disorder      Past Surgical History:   Procedure Laterality Date    AV FISTULA PLACEMENT Left      History reviewed. No pertinent family history.  Social History     Tobacco Use    Smoking status: Every Day     Current packs/day: 0.50     Types: Cigarettes    Smokeless tobacco: Never   Substance Use Topics    Drug use: Never     Review of Systems   Constitutional:  Negative for chills and fever.   HENT:  Negative for sinus pressure and sinus pain.    Respiratory:  Negative for cough and shortness of breath.    Cardiovascular:  Positive for leg swelling. Negative for chest pain.   Gastrointestinal:  Negative for nausea and vomiting.   Musculoskeletal:  Positive for arthralgias and joint swelling.   Skin:  Positive for color change and wound.   Neurological:  Negative for dizziness and weakness.   Hematological:  Negative for adenopathy. Does not bruise/bleed easily.    Psychiatric/Behavioral:  Negative for agitation and confusion.    All other systems reviewed and are negative.      Physical Exam     Initial Vitals [02/14/24 1732]   BP Pulse Resp Temp SpO2   (!) 197/85 62 18 98.4 °F (36.9 °C) (!) 93 %      MAP       --         Physical Exam    Nursing note and vitals reviewed.  Constitutional: She appears well-developed and well-nourished.   HENT:   Head: Normocephalic.   Eyes: EOM are normal. Pupils are equal, round, and reactive to light.   Neck: Neck supple.   Normal range of motion.  Cardiovascular:  Normal rate and regular rhythm.           No murmur heard.  Pulmonary/Chest: She has no wheezes. She has no rhonchi.   Abdominal: Abdomen is soft. She exhibits no distension. There is no abdominal tenderness.   Musculoskeletal:         General: Tenderness present. No edema.      Cervical back: Normal range of motion and neck supple.     Lymphadenopathy:     She has no cervical adenopathy.   Neurological: She is alert and oriented to person, place, and time. No cranial nerve deficit or sensory deficit.   Skin: Skin is warm and dry. Capillary refill takes less than 2 seconds.   Psychiatric: She has a normal mood and affect. Thought content normal.         Medical Screening Exam   See Full Note    ED Course   Procedures  Labs Reviewed - No data to display       Imaging Results    None          Medications   clindamycin in D5W 600 mg/50 mL IVPB 600 mg (0 mg Intravenous Stopped 2/14/24 1844)     Medical Decision Making    44-year-old female presents to ED with complaint of wound infection.  Patient states she was bit on her right ankle on Friday by a spider.  Patient states areas small initially with worsening of the area over the course of several days.  Patient was seen at wound care clinic in [place] and was transferred to ED for further evaluation/IV antibiotic therapy.  Patient denies recent antibiotic treatment for the area.  Patient reports increased swelling and pain to  right lower extremity.  Denies chest pain, shortness of breath, fever, chills, nausea/vomiting, diarrhea.    Labs reviewed from clinic; IV Clindamycin administered. Prescription provided    Amount and/or Complexity of Data Reviewed  External Data Reviewed: labs.     Details: CRP 0.6; BUN/Creat 8.83/61, potassium 5.7, sodium 129, chloride 91, WBC 6.8, H&H 8.7/27.7, RBC 2.69, glucose 110    Risk  Prescription drug management.                                      Clinical Impression:   Final diagnoses:  [L03.90] Cellulitis, unspecified cellulitis site (Primary)        ED Disposition Condition    Discharge Stable          ED Prescriptions       Medication Sig Dispense Start Date End Date Auth. Provider    clindamycin (CLEOCIN) 150 MG capsule () Take 2 capsules (300 mg total) by mouth 4 (four) times daily. for 7 days 56 capsule 2024 Jordyn Dangelo, BRIJESH          Follow-up Information    None          Jordyn Dangelo, BRIJESH  24 1444

## 2024-02-14 NOTE — ED NOTES
Patient states that she wears 2L NC at night time and sometimes during the day at home.  Patient O2 saturation 93% on RA .  2L NC applied and pt. O2 sat 97%.

## 2024-02-14 NOTE — ED TRIAGE NOTES
Patient presents to ED via EMS from the wound care clinic in [place] for a wound infection.  Patient states she was bitten by a spider on her right ankle this past Friday.

## 2024-02-15 ENCOUNTER — TELEPHONE (OUTPATIENT)
Dept: EMERGENCY MEDICINE | Facility: HOSPITAL | Age: 44
End: 2024-02-15
Payer: MEDICARE

## 2024-02-16 ENCOUNTER — HOSPITAL ENCOUNTER (OUTPATIENT)
Facility: HOSPITAL | Age: 44
Discharge: HOME | End: 2024-02-17
Attending: EMERGENCY MEDICINE | Admitting: HOSPITALIST
Payer: MEDICARE

## 2024-02-16 DIAGNOSIS — T63.301A SPIDER BITE: Primary | ICD-10-CM

## 2024-02-16 DIAGNOSIS — S81.801A WOUND OF RIGHT LOWER EXTREMITY, INITIAL ENCOUNTER: ICD-10-CM

## 2024-02-16 DIAGNOSIS — N18.6 END-STAGE RENAL DISEASE ON HEMODIALYSIS: ICD-10-CM

## 2024-02-16 DIAGNOSIS — R06.02 SHORTNESS OF BREATH: ICD-10-CM

## 2024-02-16 DIAGNOSIS — I10 ESSENTIAL HYPERTENSION: ICD-10-CM

## 2024-02-16 DIAGNOSIS — Z99.2 END-STAGE RENAL DISEASE ON HEMODIALYSIS: ICD-10-CM

## 2024-02-16 DIAGNOSIS — R07.9 CHEST PAIN: ICD-10-CM

## 2024-02-16 DIAGNOSIS — N18.6 ESRD (END STAGE RENAL DISEASE): ICD-10-CM

## 2024-02-16 DIAGNOSIS — I10 MALIGNANT HYPERTENSION: ICD-10-CM

## 2024-02-16 DIAGNOSIS — T63.301A SPIDER BITE WOUND, ACCIDENTAL OR UNINTENTIONAL, INITIAL ENCOUNTER: ICD-10-CM

## 2024-02-16 DIAGNOSIS — R53.1 WEAKNESS: ICD-10-CM

## 2024-02-16 PROBLEM — G62.9 NEUROPATHY: Status: ACTIVE | Noted: 2024-02-16

## 2024-02-16 PROBLEM — Z74.8 ASSISTANCE NEEDED WITH TRANSPORTATION: Status: RESOLVED | Noted: 2024-02-16 | Resolved: 2024-02-16

## 2024-02-16 PROBLEM — Z74.8 ASSISTANCE NEEDED WITH TRANSPORTATION: Status: ACTIVE | Noted: 2024-02-16

## 2024-02-16 PROBLEM — I16.0 HYPERTENSIVE URGENCY: Status: ACTIVE | Noted: 2021-08-19

## 2024-02-16 PROBLEM — Z91.148 HISTORY OF MEDICATION NONCOMPLIANCE: Status: ACTIVE | Noted: 2024-02-16

## 2024-02-16 LAB
ALBUMIN SERPL BCP-MCNC: 3 G/DL (ref 3.5–5)
ALBUMIN/GLOB SERPL: 0.8 {RATIO}
ALP SERPL-CCNC: 234 U/L (ref 37–98)
ALT SERPL W P-5'-P-CCNC: 20 U/L (ref 13–56)
ANION GAP SERPL CALCULATED.3IONS-SCNC: 21 MMOL/L (ref 7–16)
AST SERPL W P-5'-P-CCNC: 49 U/L (ref 15–37)
BASOPHILS # BLD AUTO: 0.04 K/UL (ref 0–0.2)
BASOPHILS NFR BLD AUTO: 0.7 % (ref 0–1)
BILIRUB SERPL-MCNC: 1.4 MG/DL (ref ?–1.2)
BUN SERPL-MCNC: 76 MG/DL (ref 7–18)
BUN/CREAT SERPL: 8 (ref 6–20)
CALCIUM SERPL-MCNC: 9.5 MG/DL (ref 8.5–10.1)
CHLORIDE SERPL-SCNC: 90 MMOL/L (ref 98–107)
CO2 SERPL-SCNC: 22 MMOL/L (ref 21–32)
CREAT SERPL-MCNC: 9.89 MG/DL (ref 0.55–1.02)
DIFFERENTIAL METHOD BLD: ABNORMAL
EGFR (NO RACE VARIABLE) (RUSH/TITUS): 5 ML/MIN/1.73M2
EOSINOPHIL # BLD AUTO: 0.38 K/UL (ref 0–0.5)
EOSINOPHIL NFR BLD AUTO: 6.9 % (ref 1–4)
ERYTHROCYTE [DISTWIDTH] IN BLOOD BY AUTOMATED COUNT: 16.3 % (ref 11.5–14.5)
GLOBULIN SER-MCNC: 3.8 G/DL (ref 2–4)
GLUCOSE SERPL-MCNC: 68 MG/DL (ref 70–105)
GLUCOSE SERPL-MCNC: 97 MG/DL (ref 74–106)
GLUCOSE SERPL-MCNC: 99 MG/DL (ref 70–105)
HCT VFR BLD AUTO: 24.7 % (ref 38–47)
HGB BLD-MCNC: 8.2 G/DL (ref 12–16)
IMM GRANULOCYTES # BLD AUTO: 0.03 K/UL (ref 0–0.04)
IMM GRANULOCYTES NFR BLD: 0.5 % (ref 0–0.4)
LYMPHOCYTES # BLD AUTO: 0.58 K/UL (ref 1–4.8)
LYMPHOCYTES NFR BLD AUTO: 10.5 % (ref 27–41)
MCH RBC QN AUTO: 33.1 PG (ref 27–31)
MCHC RBC AUTO-ENTMCNC: 33.2 G/DL (ref 32–36)
MCV RBC AUTO: 99.6 FL (ref 80–96)
MONOCYTES # BLD AUTO: 0.4 K/UL (ref 0–0.8)
MONOCYTES NFR BLD AUTO: 7.2 % (ref 2–6)
MPC BLD CALC-MCNC: 9.8 FL (ref 9.4–12.4)
NEUTROPHILS # BLD AUTO: 4.1 K/UL (ref 1.8–7.7)
NEUTROPHILS NFR BLD AUTO: 74.2 % (ref 53–65)
NRBC # BLD AUTO: 0 X10E3/UL
NRBC, AUTO (.00): 0 %
NT-PROBNP SERPL-MCNC: ABNORMAL PG/ML (ref 1–125)
PLATELET # BLD AUTO: 141 K/UL (ref 150–400)
POTASSIUM SERPL-SCNC: 5.9 MMOL/L (ref 3.5–5.1)
PROT SERPL-MCNC: 6.8 G/DL (ref 6.4–8.2)
RBC # BLD AUTO: 2.48 M/UL (ref 4.2–5.4)
SARS-COV-2 RDRP RESP QL NAA+PROBE: NEGATIVE
SODIUM SERPL-SCNC: 127 MMOL/L (ref 136–145)
TROPONIN I SERPL DL<=0.01 NG/ML-MCNC: 45 PG/ML
TROPONIN I SERPL DL<=0.01 NG/ML-MCNC: 50.1 PG/ML
WBC # BLD AUTO: 5.53 K/UL (ref 4.5–11)

## 2024-02-16 PROCEDURE — 83880 ASSAY OF NATRIURETIC PEPTIDE: CPT | Performed by: EMERGENCY MEDICINE

## 2024-02-16 PROCEDURE — 25000003 PHARM REV CODE 250: Performed by: INTERNAL MEDICINE

## 2024-02-16 PROCEDURE — 90935 HEMODIALYSIS ONE EVALUATION: CPT

## 2024-02-16 PROCEDURE — G0257 UNSCHED DIALYSIS ESRD PT HOS: HCPCS

## 2024-02-16 PROCEDURE — 99285 EMERGENCY DEPT VISIT HI MDM: CPT | Mod: 25,,, | Performed by: EMERGENCY MEDICINE

## 2024-02-16 PROCEDURE — 84484 ASSAY OF TROPONIN QUANT: CPT | Performed by: EMERGENCY MEDICINE

## 2024-02-16 PROCEDURE — 87635 SARS-COV-2 COVID-19 AMP PRB: CPT

## 2024-02-16 PROCEDURE — 84484 ASSAY OF TROPONIN QUANT: CPT

## 2024-02-16 PROCEDURE — G0378 HOSPITAL OBSERVATION PER HR: HCPCS

## 2024-02-16 PROCEDURE — 25000003 PHARM REV CODE 250

## 2024-02-16 PROCEDURE — 99223 1ST HOSP IP/OBS HIGH 75: CPT | Mod: AI,,, | Performed by: HOSPITALIST

## 2024-02-16 PROCEDURE — 85025 COMPLETE CBC W/AUTO DIFF WBC: CPT | Performed by: EMERGENCY MEDICINE

## 2024-02-16 PROCEDURE — 63600175 PHARM REV CODE 636 W HCPCS: Performed by: HOSPITALIST

## 2024-02-16 PROCEDURE — 96372 THER/PROPH/DIAG INJ SC/IM: CPT | Mod: 59

## 2024-02-16 PROCEDURE — 10060 I&D ABSCESS SIMPLE/SINGLE: CPT

## 2024-02-16 PROCEDURE — 93005 ELECTROCARDIOGRAM TRACING: CPT

## 2024-02-16 PROCEDURE — 25000003 PHARM REV CODE 250: Performed by: FAMILY MEDICINE

## 2024-02-16 PROCEDURE — 87070 CULTURE OTHR SPECIMN AEROBIC: CPT | Performed by: FAMILY MEDICINE

## 2024-02-16 PROCEDURE — 10060 I&D ABSCESS SIMPLE/SINGLE: CPT | Mod: ,,, | Performed by: EMERGENCY MEDICINE

## 2024-02-16 PROCEDURE — 82962 GLUCOSE BLOOD TEST: CPT

## 2024-02-16 PROCEDURE — 96365 THER/PROPH/DIAG IV INF INIT: CPT

## 2024-02-16 PROCEDURE — 99285 EMERGENCY DEPT VISIT HI MDM: CPT | Mod: 25

## 2024-02-16 PROCEDURE — 93010 ELECTROCARDIOGRAM REPORT: CPT | Mod: ,,, | Performed by: INTERNAL MEDICINE

## 2024-02-16 PROCEDURE — 80053 COMPREHEN METABOLIC PANEL: CPT | Performed by: EMERGENCY MEDICINE

## 2024-02-16 PROCEDURE — 63600175 PHARM REV CODE 636 W HCPCS: Mod: JZ,JG | Performed by: FAMILY MEDICINE

## 2024-02-16 PROCEDURE — 25000003 PHARM REV CODE 250: Performed by: HOSPITALIST

## 2024-02-16 PROCEDURE — 87075 CULTR BACTERIA EXCEPT BLOOD: CPT | Performed by: FAMILY MEDICINE

## 2024-02-16 PROCEDURE — 63600175 PHARM REV CODE 636 W HCPCS

## 2024-02-16 PROCEDURE — 96375 TX/PRO/DX INJ NEW DRUG ADDON: CPT | Mod: 59

## 2024-02-16 RX ORDER — INSULIN ASPART 100 [IU]/ML
0-5 INJECTION, SOLUTION INTRAVENOUS; SUBCUTANEOUS
Status: DISCONTINUED | OUTPATIENT
Start: 2024-02-16 | End: 2024-02-17

## 2024-02-16 RX ORDER — CLONIDINE HYDROCHLORIDE 0.2 MG/1
0.2 TABLET ORAL 2 TIMES DAILY
Status: DISCONTINUED | OUTPATIENT
Start: 2024-02-16 | End: 2024-02-17

## 2024-02-16 RX ORDER — HYDRALAZINE HYDROCHLORIDE 20 MG/ML
10 INJECTION INTRAMUSCULAR; INTRAVENOUS EVERY 6 HOURS PRN
Status: DISCONTINUED | OUTPATIENT
Start: 2024-02-16 | End: 2024-02-16

## 2024-02-16 RX ORDER — GABAPENTIN 100 MG/1
100 CAPSULE ORAL NIGHTLY
Status: DISCONTINUED | OUTPATIENT
Start: 2024-02-16 | End: 2024-02-17 | Stop reason: HOSPADM

## 2024-02-16 RX ORDER — GLUCAGON 1 MG
1 KIT INJECTION
Status: DISCONTINUED | OUTPATIENT
Start: 2024-02-16 | End: 2024-02-17

## 2024-02-16 RX ORDER — HYDROCODONE BITARTRATE AND ACETAMINOPHEN 5; 325 MG/1; MG/1
1 TABLET ORAL EVERY 6 HOURS PRN
Status: DISCONTINUED | OUTPATIENT
Start: 2024-02-16 | End: 2024-02-17

## 2024-02-16 RX ORDER — HEPARIN SODIUM 5000 [USP'U]/ML
5000 INJECTION, SOLUTION INTRAVENOUS; SUBCUTANEOUS EVERY 8 HOURS
Status: DISCONTINUED | OUTPATIENT
Start: 2024-02-16 | End: 2024-02-17 | Stop reason: HOSPADM

## 2024-02-16 RX ORDER — IBUPROFEN 200 MG
16 TABLET ORAL
Status: DISCONTINUED | OUTPATIENT
Start: 2024-02-16 | End: 2024-02-17

## 2024-02-16 RX ORDER — AMLODIPINE BESYLATE 10 MG/1
10 TABLET ORAL DAILY
Status: DISCONTINUED | OUTPATIENT
Start: 2024-02-16 | End: 2024-02-17 | Stop reason: HOSPADM

## 2024-02-16 RX ORDER — ONDANSETRON HYDROCHLORIDE 2 MG/ML
4 INJECTION, SOLUTION INTRAVENOUS
Status: COMPLETED | OUTPATIENT
Start: 2024-02-16 | End: 2024-02-16

## 2024-02-16 RX ORDER — LABETALOL HYDROCHLORIDE 5 MG/ML
10 INJECTION, SOLUTION INTRAVENOUS EVERY 4 HOURS PRN
Status: DISCONTINUED | OUTPATIENT
Start: 2024-02-16 | End: 2024-02-16

## 2024-02-16 RX ORDER — HYDRALAZINE HYDROCHLORIDE 50 MG/1
50 TABLET, FILM COATED ORAL EVERY 8 HOURS
Status: DISCONTINUED | OUTPATIENT
Start: 2024-02-16 | End: 2024-02-17

## 2024-02-16 RX ORDER — IBUPROFEN 200 MG
24 TABLET ORAL
Status: DISCONTINUED | OUTPATIENT
Start: 2024-02-16 | End: 2024-02-17 | Stop reason: HOSPADM

## 2024-02-16 RX ORDER — HYDROCODONE BITARTRATE AND ACETAMINOPHEN 7.5; 325 MG/1; MG/1
1 TABLET ORAL EVERY 12 HOURS PRN
COMMUNITY

## 2024-02-16 RX ORDER — ATORVASTATIN CALCIUM 40 MG/1
40 TABLET, FILM COATED ORAL NIGHTLY
Status: DISCONTINUED | OUTPATIENT
Start: 2024-02-16 | End: 2024-02-17 | Stop reason: HOSPADM

## 2024-02-16 RX ORDER — GABAPENTIN 300 MG/1
300 CAPSULE ORAL 2 TIMES DAILY
COMMUNITY
Start: 2024-02-05

## 2024-02-16 RX ORDER — SODIUM CHLORIDE 9 MG/ML
INJECTION, SOLUTION INTRAVENOUS
Status: DISCONTINUED | OUTPATIENT
Start: 2024-02-16 | End: 2024-02-17 | Stop reason: HOSPADM

## 2024-02-16 RX ORDER — SODIUM CHLORIDE 0.9 % (FLUSH) 0.9 %
10 SYRINGE (ML) INJECTION EVERY 12 HOURS PRN
Status: DISCONTINUED | OUTPATIENT
Start: 2024-02-16 | End: 2024-02-17 | Stop reason: HOSPADM

## 2024-02-16 RX ORDER — ASPIRIN 81 MG/1
81 TABLET ORAL DAILY
Status: DISCONTINUED | OUTPATIENT
Start: 2024-02-16 | End: 2024-02-17 | Stop reason: HOSPADM

## 2024-02-16 RX ORDER — ACETAMINOPHEN 325 MG/1
650 TABLET ORAL EVERY 8 HOURS PRN
Status: DISCONTINUED | OUTPATIENT
Start: 2024-02-16 | End: 2024-02-17 | Stop reason: HOSPADM

## 2024-02-16 RX ORDER — MORPHINE SULFATE 4 MG/ML
4 INJECTION, SOLUTION INTRAMUSCULAR; INTRAVENOUS
Status: COMPLETED | OUTPATIENT
Start: 2024-02-16 | End: 2024-02-16

## 2024-02-16 RX ORDER — OLMESARTAN MEDOXOMIL 40 MG/1
40 TABLET ORAL DAILY
COMMUNITY
Start: 2024-02-05

## 2024-02-16 RX ORDER — GLUCAGON 1 MG
1 KIT INJECTION
Status: DISCONTINUED | OUTPATIENT
Start: 2024-02-16 | End: 2024-02-17 | Stop reason: HOSPADM

## 2024-02-16 RX ORDER — HYDRALAZINE HYDROCHLORIDE 50 MG/1
2 TABLET, FILM COATED ORAL EVERY 8 HOURS
COMMUNITY
Start: 2024-02-06

## 2024-02-16 RX ORDER — IBUPROFEN 200 MG
24 TABLET ORAL
Status: DISCONTINUED | OUTPATIENT
Start: 2024-02-16 | End: 2024-02-17

## 2024-02-16 RX ORDER — IBUPROFEN 200 MG
16 TABLET ORAL
Status: DISCONTINUED | OUTPATIENT
Start: 2024-02-16 | End: 2024-02-17 | Stop reason: HOSPADM

## 2024-02-16 RX ORDER — ONDANSETRON HYDROCHLORIDE 2 MG/ML
4 INJECTION, SOLUTION INTRAVENOUS EVERY 6 HOURS PRN
Status: DISCONTINUED | OUTPATIENT
Start: 2024-02-16 | End: 2024-02-17 | Stop reason: HOSPADM

## 2024-02-16 RX ORDER — NALOXONE HCL 0.4 MG/ML
0.02 VIAL (ML) INJECTION
Status: DISCONTINUED | OUTPATIENT
Start: 2024-02-16 | End: 2024-02-17 | Stop reason: HOSPADM

## 2024-02-16 RX ORDER — LIDOCAINE HYDROCHLORIDE 10 MG/ML
10 INJECTION, SOLUTION EPIDURAL; INFILTRATION; INTRACAUDAL; PERINEURAL
Status: COMPLETED | OUTPATIENT
Start: 2024-02-16 | End: 2024-02-16

## 2024-02-16 RX ORDER — LABETALOL HYDROCHLORIDE 5 MG/ML
20 INJECTION, SOLUTION INTRAVENOUS EVERY 4 HOURS PRN
Status: DISCONTINUED | OUTPATIENT
Start: 2024-02-16 | End: 2024-02-17 | Stop reason: HOSPADM

## 2024-02-16 RX ADMIN — HYDRALAZINE HYDROCHLORIDE 50 MG: 50 TABLET, FILM COATED ORAL at 08:02

## 2024-02-16 RX ADMIN — ONDANSETRON 4 MG: 2 INJECTION INTRAMUSCULAR; INTRAVENOUS at 06:02

## 2024-02-16 RX ADMIN — MORPHINE SULFATE 4 MG: 4 INJECTION, SOLUTION INTRAMUSCULAR; INTRAVENOUS at 06:02

## 2024-02-16 RX ADMIN — LIDOCAINE HYDROCHLORIDE 100 MG: 10 INJECTION, SOLUTION EPIDURAL; INFILTRATION; INTRACAUDAL; PERINEURAL at 06:02

## 2024-02-16 RX ADMIN — SODIUM CHLORIDE 2000 ML: 9 INJECTION, SOLUTION INTRAVENOUS at 06:02

## 2024-02-16 RX ADMIN — LABETALOL HYDROCHLORIDE 10 MG: 5 INJECTION INTRAVENOUS at 08:02

## 2024-02-16 RX ADMIN — VANCOMYCIN HYDROCHLORIDE 1250 MG: 500 INJECTION, POWDER, LYOPHILIZED, FOR SOLUTION INTRAVENOUS at 09:02

## 2024-02-16 RX ADMIN — GABAPENTIN 100 MG: 100 CAPSULE ORAL at 08:02

## 2024-02-16 RX ADMIN — HEPARIN SODIUM 5000 UNITS: 5000 INJECTION, SOLUTION INTRAVENOUS; SUBCUTANEOUS at 10:02

## 2024-02-16 RX ADMIN — ATORVASTATIN CALCIUM 40 MG: 40 TABLET, FILM COATED ORAL at 08:02

## 2024-02-16 RX ADMIN — HYDROCODONE BITARTRATE AND ACETAMINOPHEN 1 TABLET: 5; 325 TABLET ORAL at 09:02

## 2024-02-16 RX ADMIN — CLONIDINE HYDROCHLORIDE 0.2 MG: 0.1 TABLET ORAL at 08:02

## 2024-02-16 RX ADMIN — BACITRACIN ZINC, NEOMYCIN, POLYMYXIN B 1 EACH: 400; 3.5; 5 OINTMENT TOPICAL at 06:02

## 2024-02-16 NOTE — HPI
44-year-old woman with several chronic medical problems including ESRD.  She was sent to the ER for evaluation of a wound on her right foot.  Blood pressure is not controlled.  She missed her regular dialysis on Wednesday.  She has mild SOB.

## 2024-02-16 NOTE — ASSESSMENT & PLAN NOTE
Patient states that she has been dependent on a friend for transportation, but that person has not been reliable     - Referral to new PCP   - SS consult placed for resources

## 2024-02-16 NOTE — HPI
44-year-old female who was seen in consult by General surgery for wound right foot possible spider bite.  The patient was transferred for evaluation of wound.  Also on admission poorly controlled hypertension and volume overloaded after missing dialysis Wednesday.  Patient was transferred from [facility] after presenting there for wound and was found to be hyperkalemic.  White count today is 5 the patient is afebrile no reported nausea, vomiting, fever or chills.

## 2024-02-16 NOTE — SUBJECTIVE & OBJECTIVE
Past Medical History:   Diagnosis Date    Diabetes mellitus     Dialysis patient     Hypertension     Nerve pain     Renal disorder        Past Surgical History:   Procedure Laterality Date    AV FISTULA PLACEMENT Left        Review of patient's allergies indicates:   Allergen Reactions    Pseudoephedrine Shortness Of Breath and Other (See Comments)    Ampicillin     Penicillins     Sudafed cold-allergy        No current facility-administered medications on file prior to encounter.     Current Outpatient Medications on File Prior to Encounter   Medication Sig    olmesartan (BENICAR) 40 MG tablet Take 40 mg by mouth.    amLODIPine (NORVASC) 10 MG tablet Take 10 mg by mouth once daily.    aspirin (ECOTRIN) 81 MG EC tablet Take 1 tablet (81 mg total) by mouth once daily.    atorvastatin (LIPITOR) 40 MG tablet Take 1 tablet (40 mg total) by mouth every evening.    clindamycin (CLEOCIN) 150 MG capsule Take 2 capsules (300 mg total) by mouth 4 (four) times daily. for 7 days    cloNIDine (CATAPRES) 0.2 MG tablet Take 0.2 mg by mouth 2 (two) times daily.    gabapentin (NEURONTIN) 100 MG capsule Take 1 capsule (100 mg total) by mouth nightly.    hydrALAZINE (APRESOLINE) 25 MG tablet Take 2 tablets (50 mg total) by mouth every 8 (eight) hours.    ibuprofen (ADVIL,MOTRIN) 600 MG tablet Take 1 tablet (600 mg total) by mouth every 8 (eight) hours.    metoprolol tartrate (LOPRESSOR) 25 MG tablet Take 1 tablet (25 mg total) by mouth 2 (two) times daily.    [DISCONTINUED] losartan (COZAAR) 50 MG tablet Take 50 mg by mouth once daily.     Family History    None       Tobacco Use    Smoking status: Every Day     Current packs/day: 0.50     Types: Cigarettes    Smokeless tobacco: Never   Substance and Sexual Activity    Alcohol use: Not on file    Drug use: Never    Sexual activity: Not on file     Review of Systems   Constitutional:  Positive for fatigue. Negative for chills and fever.   Respiratory:  Negative for cough, shortness of  breath and wheezing.    Cardiovascular:  Positive for leg swelling. Negative for chest pain.   Gastrointestinal:  Negative for abdominal pain, diarrhea, nausea and vomiting.   Musculoskeletal:  Positive for myalgias.   Skin:  Positive for color change and wound.   Neurological:  Negative for weakness and headaches.     Objective:     Vital Signs (Most Recent):  Temp: 98.2 °F (36.8 °C) (02/16/24 1555)  Pulse: 82 (02/16/24 1630)  Resp: 16 (02/16/24 1630)  BP: (!) 215/91 (02/16/24 1630)  SpO2: 96 % (02/16/24 0712) Vital Signs (24h Range):  Temp:  [98.2 °F (36.8 °C)-99.8 °F (37.7 °C)] 98.2 °F (36.8 °C)  Pulse:  [68-82] 82  Resp:  [10-19] 16  SpO2:  [94 %-99 %] 96 %  BP: (147-215)/() 215/91     Weight: 63.5 kg (140 lb)  Body mass index is 27.34 kg/m².     Physical Exam  Constitutional:       General: She is not in acute distress.     Appearance: Normal appearance.   HENT:      Head: Normocephalic and atraumatic.      Right Ear: External ear normal.      Left Ear: External ear normal.      Nose: Nose normal.      Mouth/Throat:      Mouth: Mucous membranes are moist.   Eyes:      Extraocular Movements: Extraocular movements intact.      Conjunctiva/sclera: Conjunctivae normal.      Pupils: Pupils are equal, round, and reactive to light.   Cardiovascular:      Rate and Rhythm: Normal rate and regular rhythm.      Pulses: Normal pulses.      Heart sounds: Murmur heard.   Pulmonary:      Effort: Pulmonary effort is normal.      Breath sounds: Normal breath sounds.   Abdominal:      General: Bowel sounds are normal.      Palpations: Abdomen is soft.          Comments: Umbilical hernia noted and easily reducible    Musculoskeletal:         General: Normal range of motion.        Arms:         Legs:       Comments: Spider bite/round lesion (alma delia-sized) noted on her RT medial ankle.     AV fistula noted in LT arm. Palpable thrill and bruit noted    Skin:     General: Skin is warm.   Neurological:      General: No focal  deficit present.      Mental Status: She is alert and oriented to person, place, and time.   Psychiatric:         Mood and Affect: Mood normal.         Behavior: Behavior normal.         Thought Content: Thought content normal.         Judgment: Judgment normal.              CRANIAL NERVES     CN III, IV, VI   Pupils are equal, round, and reactive to light.       Significant Labs: All pertinent labs within the past 24 hours have been reviewed.    Significant Imaging: I have reviewed all pertinent imaging results/findings within the past 24 hours.

## 2024-02-16 NOTE — HPI
Patient is a 44 year old female who presents from [facility] for hyperkalemia and a right medial ankle spider bite/lesion.The patient states that she noticed it 1 week ago on Friday. The patient states that she was feeding kittens outside her house when a cat reached for her pants. When she looked down, there was a large grey and brown spider on her pants. She went to the ER at [facility] when the spider bite did not improve on Wednesday. She was discharged on Clindamycin and missed dialysis that day. On Thursday, her daughter was sick. Friday morning, she returned to [facility] because she missed dialysis. The patient was transferred from [facility] due to hyperkalemia on labs and her spider bite.      The patient states that she has a PMH of HTN, ESRD on dialysis M/W/F with [facility] via a LT arm AV fistula, and neuropathy. She denies a history of DMII. Her cardiologist is Dr. Corral at [facility]. Her nephrologist is Dr. Matias. The patient states that she does not currently have a PCP, as they have left.     In the ED:   CXR: Patchy airspace opacities scattered throughout the lungs, edema vs infection. Bulky calcifications present on bilateral humerus.   Troponin: 45  BNP: 118,188  CBC: H/H is 8.2/24.7.   CMP: Na 127, K 5.4. Alk Phos 234, AST 49     An I+D was performed on the right medial ankle in the ED.     The patient was admitted to family medicine service under the direct supervision of Dr. Homero Emerson for the continued care and management of this patient

## 2024-02-16 NOTE — ED PROVIDER NOTES
Encounter Date: 2/16/2024       History     Chief Complaint   Patient presents with    Leg Pain     Right Inner Ankle wound/questionable spider bite     Abnormal Lab     Transfer from [facility] for hyperkalemia. Hx of dialysis     Patient is a 44-year-old female with history of diabetes and chronic renal failure on dialysis who missed her dialysis on Wednesday.  Patient was seen here a few days ago for what she thought was a spider bite on her medial right ankle.  She went back to the emergency department at [place] last night complaining of the spider bite.  That area has turned into an abscess now however that was not addressed at [place].  Labs there showed that she was significantly hyperkalemic with potassium of 6.8.  Patient denies any chest pain, shortness breath, or other acute symptoms or complaints.        Review of patient's allergies indicates:   Allergen Reactions    Pseudoephedrine Shortness Of Breath and Other (See Comments)    Ampicillin     Penicillins     Sudafed cold-allergy      Past Medical History:   Diagnosis Date    Diabetes mellitus     Dialysis patient     Hypertension     Nerve pain     Renal disorder      Past Surgical History:   Procedure Laterality Date    AV FISTULA PLACEMENT Left      History reviewed. No pertinent family history.  Social History     Tobacco Use    Smoking status: Every Day     Current packs/day: 0.50     Types: Cigarettes    Smokeless tobacco: Never   Substance Use Topics    Drug use: Never     Review of Systems   Cardiovascular:  Positive for leg swelling.   Skin:         Abscess to right medial ankle.   All other systems reviewed and are negative.      Physical Exam     Initial Vitals   BP Pulse Resp Temp SpO2   02/16/24 0535 02/16/24 0535 02/16/24 0535 02/16/24 0537 02/16/24 0535   (!) 202/77 72 10 98.4 °F (36.9 °C) 96 %      MAP       --                Physical Exam    Nursing note and vitals reviewed.  Constitutional: She appears well-developed and  well-nourished.   HENT:   Head: Normocephalic.   Eyes: Pupils are equal, round, and reactive to light.   Cardiovascular:  Normal rate.           Pulmonary/Chest: Breath sounds normal.   Abdominal: Abdomen is soft.   Musculoskeletal:         General: Normal range of motion.     Neurological: She is alert.   Skin: Skin is warm. Capillary refill takes less than 2 seconds.   There is approximately a 3 cm fluctuant abscess involving the right medial ankle.   Psychiatric: She has a normal mood and affect.         Medical Screening Exam   See Full Note    ED Course   Procedures  Labs Reviewed   COMPREHENSIVE METABOLIC PANEL - Abnormal; Notable for the following components:       Result Value    Sodium 127 (*)     Potassium 5.9 (*)     Chloride 90 (*)     Anion Gap 21 (*)     BUN 76 (*)     Creatinine 9.89 (*)     Albumin 3.0 (*)     Bilirubin, Total 1.4 (*)     Alk Phos 234 (*)     AST 49 (*)     eGFR 5 (*)     All other components within normal limits   NT-PRO NATRIURETIC PEPTIDE - Abnormal; Notable for the following components:    ProBNP 118,188 (*)     All other components within normal limits   CBC WITH DIFFERENTIAL - Abnormal; Notable for the following components:    RBC 2.48 (*)     Hemoglobin 8.2 (*)     Hematocrit 24.7 (*)     MCV 99.6 (*)     MCH 33.1 (*)     RDW 16.3 (*)     Platelet Count 141 (*)     Neutrophils % 74.2 (*)     Lymphocytes % 10.5 (*)     Monocytes % 7.2 (*)     Eosinophils % 6.9 (*)     Immature Granulocytes % 0.5 (*)     Lymphocytes, Absolute 0.58 (*)     All other components within normal limits   POCT GLUCOSE MONITORING CONTINUOUS - Abnormal; Notable for the following components:    POC Glucose 68 (*)     All other components within normal limits   TROPONIN I - Normal   TROPONIN I - Normal   SARS-COV-2 RNA AMPLIFICATION, QUAL - Normal    Narrative:     Negative SARS-CoV results should not be used as the sole basis for treatment or patient management decisions; negative results should be  considered in the context of a patient's recent exposures, history and the presene of clinical signs and symptoms consistent with COVID-19.  Negative results should be treated as presumptive and confirmed by molecular assay, if necessary for patient management.   CULTURE, WOUND   CULTURE, ANAEROBE   CBC W/ AUTO DIFFERENTIAL    Narrative:     The following orders were created for panel order CBC auto differential.  Procedure                               Abnormality         Status                     ---------                               -----------         ------                     CBC with Differential[[id number]]       Abnormal            Final result                 Please view results for these tests on the individual orders.   CBC W/ AUTO DIFFERENTIAL    Narrative:     The following orders were created for panel order CBC Auto Differential.  Procedure                               Abnormality         Status                     ---------                               -----------         ------                     CBC with Differential[[id number]]                                                        Please view results for these tests on the individual orders.   COMPREHENSIVE METABOLIC PANEL   HEMOGLOBIN A1C   VITAMIN D   PHOSPHORUS   MAGNESIUM   CBC WITH DIFFERENTIAL   POCT GLUCOSE MONITORING CONTINUOUS   POCT GLUCOSE MONITORING CONTINUOUS          Imaging Results              X-Ray Chest AP Portable (Final result)  Result time 02/16/24 08:00:01      Final result by Alexandro Haji DO (02/16/24 08:00:01)                   Impression:      Patchy airspace opacities scattered throughout the lungs, edema versus infection.    Bulky calcifications overlying the bilateral humerus.      Electronically signed by: Alexandro Haji  Date:    02/16/2024  Time:    08:00               Narrative:    EXAMINATION:  XR CHEST AP PORTABLE    CLINICAL HISTORY:  CHF;    TECHNIQUE:  XR CHEST AP  PORTABLE    COMPARISON:  2021    FINDINGS:  No lines or tubes.    Patchy airspace opacities scattered throughout the lungs, edema versus infection.    Normal pleura.    Cardiac silhouette is similar to comparison exam.    No obvious acute bone findings.    Bulky calcifications overlying the bilateral humerus.                                       Medications   aspirin EC tablet 81 mg (81 mg Oral Not Given 2/16/24 1500)   atorvastatin tablet 40 mg (40 mg Oral Given 2/16/24 2014)   amLODIPine tablet 10 mg (10 mg Oral Not Given 2/16/24 1500)   cloNIDine tablet 0.2 mg (0.2 mg Oral Given 2/16/24 2014)   gabapentin capsule 100 mg (100 mg Oral Given 2/16/24 2014)   hydrALAZINE tablet 50 mg (50 mg Oral Given 2/16/24 2014)   glucose chewable tablet 16 g (has no administration in time range)   glucose chewable tablet 24 g (has no administration in time range)   glucagon (human recombinant) injection 1 mg (has no administration in time range)   insulin aspart U-100 injection 0-5 Units ( Subcutaneous Canceled Entry 2/16/24 2059)   dextrose 10% bolus 125 mL 125 mL (has no administration in time range)   dextrose 10% bolus 250 mL 250 mL (has no administration in time range)   0.9%  NaCl infusion (0 mL/hr Intravenous Stopped 2/16/24 2036)   sodium chloride 0.9% flush 10 mL (has no administration in time range)   naloxone 0.4 mg/mL injection 0.02 mg (has no administration in time range)   glucose chewable tablet 16 g (has no administration in time range)   glucose chewable tablet 24 g (has no administration in time range)   glucagon (human recombinant) injection 1 mg (has no administration in time range)   heparin (porcine) injection 5,000 Units (5,000 Units Subcutaneous Given 2/16/24 2200)   acetaminophen tablet 650 mg (has no administration in time range)   dextrose 10% bolus 125 mL 125 mL (has no administration in time range)   dextrose 10% bolus 250 mL 250 mL (has no administration in time range)   ondansetron injection 4 mg  (has no administration in time range)   vancomycin - pharmacy to dose (has no administration in time range)   HYDROcodone-acetaminophen 5-325 mg per tablet 1 tablet (1 tablet Oral Given 2/16/24 2123)   labetaloL injection 20 mg (20 mg Intravenous Given 2/17/24 0126)   LIDOcaine (PF) 10 mg/ml (1%) injection 100 mg (100 mg Infiltration Given 2/16/24 0617)   morphine injection 4 mg (4 mg Intravenous Given 2/16/24 0632)   ondansetron injection 4 mg (4 mg Intravenous Given 2/16/24 0632)   neomycin-bacitracnZn-polymyxnB packet (1 each Topical (Top) Given 2/16/24 0640)   vancomycin (VANCOCIN) 1,250 mg in dextrose 5 % (D5W) 250 mL IVPB (0 mg Intravenous Stopped 2/16/24 2246)     Medical Decision Making  Amount and/or Complexity of Data Reviewed  Labs: ordered.  Radiology: ordered.    Risk  OTC drugs.  Prescription drug management.               ED Course as of 02/17/24 0323   Fri Feb 16, 2024   0543 Medical decision-making:  Differential diagnosis includes abscess, chronic renal failure, CHF, hyperkalemia.  All labs and imaging ordered and interpreted by me. [BB]      ED Course User Index  [BB] Sean Reyna MD               Medical Decision Making:   Initial Assessment:   Patient in with hyperkalemia missed dialysis on Wednesday.  Abscess to the right ankle.  On the medial side from spider bite  Differential Diagnosis:   Abscess to the medial ankle from a spider bite hyperkalemia sent directly from [facility] here  ED Management:  I&D done procedure note after consent form signed xylocaine plain was injected in around the spider bite and then with 11 blade scalpel the area was open with no complications good drainage--culture done and then a iodoform gauze was placed his packing  Discussed the case with Dr. Emerson.  At the present time will try to get Dr. Matias to consult with the patient who is on for Nephrology.  The patient normally sees Dr. Matias.  For dialysis--Polly called at 10:25 am              Clinical Impression:   Final diagnoses:  [R53.1] Weakness  [R06.02] Shortness of breath        ED Disposition Condition    Observation                 Sean Reyna MD  02/17/24 0324

## 2024-02-16 NOTE — ASSESSMENT & PLAN NOTE
Transfer from [facility] for right ankle wound possibly spider bite.  Afebrile, white count 5.  No need for surgical intervention at this time.  We will continue to monitor wound.  Continue with daily dressing changes

## 2024-02-16 NOTE — ASSESSMENT & PLAN NOTE
Chronic, uncontrolled. Latest blood pressure and vitals reviewed-     Temp:  [98.4 °F (36.9 °C)-99.8 °F (37.7 °C)]   Pulse:  [68-81]   Resp:  [10-19]   BP: (147-215)/(65-84)   SpO2:  [94 %-99 %] .   Home meds for hypertension were reviewed and noted below.   Hypertension Medications               amLODIPine (NORVASC) 10 MG tablet Take 10 mg by mouth once daily.    cloNIDine (CATAPRES) 0.2 MG tablet Take 0.2 mg by mouth 2 (two) times daily.    hydrALAZINE (APRESOLINE) 25 MG tablet Take 2 tablets (50 mg total) by mouth every 8 (eight) hours.              While in the hospital, will manage blood pressure as follows; Continue home antihypertensive regimen    Will utilize p.r.n. blood pressure medication only if patient's blood pressure greater than 180/110 and she develops symptoms such as worsening chest pain or shortness of breath.

## 2024-02-16 NOTE — ASSESSMENT & PLAN NOTE
She has mild volume overload and is hyperkalemic.  Arrangements for dialysis were made.  I initially saw her in the ER and again in dialysis.

## 2024-02-16 NOTE — PROGRESS NOTES
Pharmacy consulted to assist with the management of vancomycin in this patient to treat: spider bite    Patient weight: 63.5 kg  Patient CrCl: on dialysis    Will pulse dose with vancomycin 1250mg    Not sure of dialysis schedule yet. Will schedule random level once determined.    Pharmacy will continue to monitor and make adjustments as needed.      Thank you for the consult,    Mary Lou Self, PharmD  [phone]

## 2024-02-16 NOTE — CONSULTS
[facility] - Emergency Department  Nephrology  Consult Note    Patient Name: Alissa Dominguez  MRN: [id number]  Admission Date: 2/16/2024  Hospital Length of Stay: 0 days  Attending Provider: Artur Diallo DO   Primary Care Physician: Silke, Primary Doctor  Principal Problem:Spider bite    Consults  Subjective:     HPI: 44-year-old woman with several chronic medical problems including ESRD.  She was sent to the ER for evaluation of a wound on her right foot.  Blood pressure is not controlled.  She missed her regular dialysis on Wednesday.  She has mild SOB.    Past Medical History:   Diagnosis Date    Diabetes mellitus     Dialysis patient     Hypertension     Nerve pain     Renal disorder        Past Surgical History:   Procedure Laterality Date    AV FISTULA PLACEMENT Left        Review of patient's allergies indicates:   Allergen Reactions    Pseudoephedrine Shortness Of Breath and Other (See Comments)    Ampicillin     Penicillins     Sudafed cold-allergy      Current Facility-Administered Medications   Medication Frequency    amLODIPine tablet 10 mg Daily    aspirin EC tablet 81 mg Daily    atorvastatin tablet 40 mg QHS    cloNIDine tablet 0.2 mg BID    dextrose 10% bolus 125 mL 125 mL PRN    dextrose 10% bolus 250 mL 250 mL PRN    gabapentin capsule 100 mg Nightly    glucagon (human recombinant) injection 1 mg PRN    glucose chewable tablet 16 g PRN    glucose chewable tablet 24 g PRN    hydrALAZINE tablet 50 mg Q8H    insulin aspart U-100 injection 0-5 Units QID (AC + HS) PRN    labetaloL injection 10 mg Q4H PRN     Current Outpatient Medications   Medication    olmesartan (BENICAR) 40 MG tablet    amLODIPine (NORVASC) 10 MG tablet    aspirin (ECOTRIN) 81 MG EC tablet    atorvastatin (LIPITOR) 40 MG tablet    clindamycin (CLEOCIN) 150 MG capsule    cloNIDine (CATAPRES) 0.2 MG tablet    gabapentin (NEURONTIN) 100 MG capsule    hydrALAZINE (APRESOLINE) 25 MG tablet    ibuprofen (ADVIL,MOTRIN) 600  MG tablet    metoprolol tartrate (LOPRESSOR) 25 MG tablet     Family History    None       Tobacco Use    Smoking status: Every Day     Current packs/day: 0.50     Types: Cigarettes    Smokeless tobacco: Never   Substance and Sexual Activity    Alcohol use: Not on file    Drug use: Never    Sexual activity: Not on file     Review of Systems   Constitutional:  Negative for appetite change and fever.   HENT: Negative.     Respiratory:  Positive for shortness of breath. Negative for cough.    Cardiovascular:  Negative for chest pain.   Gastrointestinal:  Negative for abdominal pain, nausea and vomiting.   Genitourinary:  Negative for dysuria and hematuria.   Skin:  Positive for wound.   Neurological:  Negative for syncope.   Psychiatric/Behavioral:  Negative for confusion.      Objective:     Vital Signs (Most Recent):  Temp: 98.4 °F (36.9 °C) (02/16/24 0537)  Pulse: 68 (02/16/24 0712)  Resp: 10 (02/16/24 0712)  BP: (!) 195/77 (02/16/24 0712)  SpO2: 96 % (02/16/24 0712) Vital Signs (24h Range):  Temp:  [98.4 °F (36.9 °C)-99.8 °F (37.7 °C)] 98.4 °F (36.9 °C)  Pulse:  [68-81] 68  Resp:  [10-19] 10  SpO2:  [94 %-99 %] 96 %  BP: (147-215)/(65-84) 195/77     Weight: 63.5 kg (140 lb) (02/16/24 1548)  Body mass index is 27.34 kg/m².  Body surface area is 1.64 meters squared.    No intake/output data recorded.     Physical Exam  Eyes:      Pupils: Pupils are equal, round, and reactive to light.   Cardiovascular:      Rate and Rhythm: Normal rate and regular rhythm.   Pulmonary:      Breath sounds: No wheezing or rales.   Abdominal:      Palpations: Abdomen is soft.      Tenderness: There is no abdominal tenderness.   Musculoskeletal:      Cervical back: Neck supple.      Right lower leg: Edema present.      Left lower leg: Edema present.      Comments: Right foot wound has been debrided and is dressed   Neurological:      Mental Status: She is alert.          Significant Labs:  BMP:   Recent Labs   Lab 02/16/24  0610   GLU 97    *   K 5.9*   CL 90*   CO2 22   BUN 76*   CREATININE 9.89*   CALCIUM 9.5     CBC:   Recent Labs   Lab 02/16/24  0610   WBC 5.53   RBC 2.48*   HGB 8.2*   HCT 24.7*   *   MCV 99.6*   MCH 33.1*   MCHC 33.2       Significant Imaging:    Assessment/Plan:     Cardiac/Vascular  Hypertensive urgency  Resume outpatient meds.  This should improve with volume removal during dialysis    Renal/  ESRD (end stage renal disease)  She has mild volume overload and is hyperkalemic.  Arrangements for dialysis were made.  I initially saw her in the ER and again in dialysis.    Endocrine  Type 2 diabetes mellitus, without long-term current use of insulin  Underlying condition    Other  * Spider bite  Wound debrided in ER.        Thank you for your consult.     Carl Matias MD  Nephrology  [facility] - Emergency Department

## 2024-02-16 NOTE — CONSULTS
[facility] - Emergency Department  General Surgery  Consult Note    Patient Name: Alissa Dominguez  MRN: [id number]  Code Status: Prior  Admission Date: 2/16/2024  Hospital Length of Stay: 0 days  Attending Physician: Artur Diallo DO  Primary Care Provider: Silke Primary Doctor    Patient information was obtained from patient, past medical records, and ER records.     Consults  Subjective:     Principal Problem: Spider bite    History of Present Illness: 44-year-old female who was seen in consult by General surgery for wound right foot possible spider bite.  The patient was transferred for evaluation of wound.  Also on admission poorly controlled hypertension and volume overloaded after missing dialysis Wednesday.  Patient was transferred from [facility] after presenting there for wound and was found to be hyperkalemic.  White count today is 5 the patient is afebrile no reported nausea, vomiting, fever or chills.    No current facility-administered medications on file prior to encounter.     Current Outpatient Medications on File Prior to Encounter   Medication Sig    olmesartan (BENICAR) 40 MG tablet Take 40 mg by mouth.    amLODIPine (NORVASC) 10 MG tablet Take 10 mg by mouth once daily.    aspirin (ECOTRIN) 81 MG EC tablet Take 1 tablet (81 mg total) by mouth once daily.    atorvastatin (LIPITOR) 40 MG tablet Take 1 tablet (40 mg total) by mouth every evening.    clindamycin (CLEOCIN) 150 MG capsule Take 2 capsules (300 mg total) by mouth 4 (four) times daily. for 7 days    cloNIDine (CATAPRES) 0.2 MG tablet Take 0.2 mg by mouth 2 (two) times daily.    gabapentin (NEURONTIN) 100 MG capsule Take 1 capsule (100 mg total) by mouth nightly.    hydrALAZINE (APRESOLINE) 25 MG tablet Take 2 tablets (50 mg total) by mouth every 8 (eight) hours.    ibuprofen (ADVIL,MOTRIN) 600 MG tablet Take 1 tablet (600 mg total) by mouth every 8 (eight) hours.    metoprolol tartrate (LOPRESSOR) 25 MG tablet Take 1  tablet (25 mg total) by mouth 2 (two) times daily.    [DISCONTINUED] losartan (COZAAR) 50 MG tablet Take 50 mg by mouth once daily.       Review of patient's allergies indicates:   Allergen Reactions    Pseudoephedrine Shortness Of Breath and Other (See Comments)    Ampicillin     Penicillins     Sudafed cold-allergy        Past Medical History:   Diagnosis Date    Diabetes mellitus     Dialysis patient     Hypertension     Nerve pain     Renal disorder      Past Surgical History:   Procedure Laterality Date    AV FISTULA PLACEMENT Left      Family History    None       Tobacco Use    Smoking status: Every Day     Current packs/day: 0.50     Types: Cigarettes    Smokeless tobacco: Never   Substance and Sexual Activity    Alcohol use: Not on file    Drug use: Never    Sexual activity: Not on file     Review of Systems   Constitutional:  Negative for appetite change and fever.   HENT: Negative.     Respiratory:  Positive for shortness of breath. Negative for cough.    Cardiovascular:  Negative for chest pain.   Gastrointestinal:  Negative for nausea and vomiting.   Skin:  Positive for wound.   All other systems reviewed and are negative.    Objective:     Vital Signs (Most Recent):  Temp: 98.4 °F (36.9 °C) (02/16/24 0537)  Pulse: 68 (02/16/24 0712)  Resp: 10 (02/16/24 0712)  BP: (!) 195/77 (02/16/24 0712)  SpO2: 96 % (02/16/24 0712) Vital Signs (24h Range):  Temp:  [98.4 °F (36.9 °C)-99.8 °F (37.7 °C)] 98.4 °F (36.9 °C)  Pulse:  [68-81] 68  Resp:  [10-19] 10  SpO2:  [94 %-99 %] 96 %  BP: (147-215)/(65-84) 195/77     Weight: 63.5 kg (140 lb)  Body mass index is 27.34 kg/m².     Physical Exam  Vitals reviewed.   HENT:      Mouth/Throat:      Mouth: Mucous membranes are moist.   Eyes:      Extraocular Movements: Extraocular movements intact.   Cardiovascular:      Rate and Rhythm: Normal rate and regular rhythm.      Pulses: Normal pulses.   Pulmonary:      Effort: Pulmonary effort is normal.      Breath sounds: No  wheezing or rales.   Abdominal:      General: Bowel sounds are normal.      Palpations: Abdomen is soft.      Tenderness: There is no abdominal tenderness.   Musculoskeletal:      Cervical back: Neck supple.      Right lower leg: Edema present.      Left lower leg: Edema present.      Comments: Right foot wound    Skin:     General: Skin is warm and dry.      Capillary Refill: Capillary refill takes less than 2 seconds.   Neurological:      General: No focal deficit present.      Mental Status: She is alert.   Psychiatric:         Mood and Affect: Mood normal.            I have reviewed all pertinent lab results within the past 24 hours.    Significant Diagnostics:  I have reviewed all pertinent imaging results/findings within the past 24 hours.    Assessment/Plan:     Wound of right lower extremity  Transfer from [facility] for right ankle wound possibly spider bite.  Afebrile, white count 5.  No need for surgical intervention at this time.  We will continue to monitor wound.  Continue with daily dressing changes      VTE Risk Mitigation (From admission, onward)      None            Thank you for your consult. I will follow-up with patient. Please contact us if you have any additional questions.    RODRIGO Alaniz  General Surgery  [facility] - Emergency Department

## 2024-02-16 NOTE — SUBJECTIVE & OBJECTIVE
No current facility-administered medications on file prior to encounter.     Current Outpatient Medications on File Prior to Encounter   Medication Sig    olmesartan (BENICAR) 40 MG tablet Take 40 mg by mouth.    amLODIPine (NORVASC) 10 MG tablet Take 10 mg by mouth once daily.    aspirin (ECOTRIN) 81 MG EC tablet Take 1 tablet (81 mg total) by mouth once daily.    atorvastatin (LIPITOR) 40 MG tablet Take 1 tablet (40 mg total) by mouth every evening.    clindamycin (CLEOCIN) 150 MG capsule Take 2 capsules (300 mg total) by mouth 4 (four) times daily. for 7 days    cloNIDine (CATAPRES) 0.2 MG tablet Take 0.2 mg by mouth 2 (two) times daily.    gabapentin (NEURONTIN) 100 MG capsule Take 1 capsule (100 mg total) by mouth nightly.    hydrALAZINE (APRESOLINE) 25 MG tablet Take 2 tablets (50 mg total) by mouth every 8 (eight) hours.    ibuprofen (ADVIL,MOTRIN) 600 MG tablet Take 1 tablet (600 mg total) by mouth every 8 (eight) hours.    metoprolol tartrate (LOPRESSOR) 25 MG tablet Take 1 tablet (25 mg total) by mouth 2 (two) times daily.    [DISCONTINUED] losartan (COZAAR) 50 MG tablet Take 50 mg by mouth once daily.       Review of patient's allergies indicates:   Allergen Reactions    Pseudoephedrine Shortness Of Breath and Other (See Comments)    Ampicillin     Penicillins     Sudafed cold-allergy        Past Medical History:   Diagnosis Date    Diabetes mellitus     Dialysis patient     Hypertension     Nerve pain     Renal disorder      Past Surgical History:   Procedure Laterality Date    AV FISTULA PLACEMENT Left      Family History    None       Tobacco Use    Smoking status: Every Day     Current packs/day: 0.50     Types: Cigarettes    Smokeless tobacco: Never   Substance and Sexual Activity    Alcohol use: Not on file    Drug use: Never    Sexual activity: Not on file     Review of Systems   Constitutional:  Negative for appetite change and fever.   HENT: Negative.     Respiratory:  Positive for shortness of  breath. Negative for cough.    Cardiovascular:  Negative for chest pain.   Gastrointestinal:  Negative for nausea and vomiting.   Skin:  Positive for wound.   All other systems reviewed and are negative.    Objective:     Vital Signs (Most Recent):  Temp: 98.4 °F (36.9 °C) (02/16/24 0537)  Pulse: 68 (02/16/24 0712)  Resp: 10 (02/16/24 0712)  BP: (!) 195/77 (02/16/24 0712)  SpO2: 96 % (02/16/24 0712) Vital Signs (24h Range):  Temp:  [98.4 °F (36.9 °C)-99.8 °F (37.7 °C)] 98.4 °F (36.9 °C)  Pulse:  [68-81] 68  Resp:  [10-19] 10  SpO2:  [94 %-99 %] 96 %  BP: (147-215)/(65-84) 195/77     Weight: 63.5 kg (140 lb)  Body mass index is 27.34 kg/m².     Physical Exam  Vitals reviewed.   HENT:      Mouth/Throat:      Mouth: Mucous membranes are moist.   Eyes:      Extraocular Movements: Extraocular movements intact.   Cardiovascular:      Rate and Rhythm: Normal rate and regular rhythm.      Pulses: Normal pulses.   Pulmonary:      Effort: Pulmonary effort is normal.      Breath sounds: No wheezing or rales.   Abdominal:      General: Bowel sounds are normal.      Palpations: Abdomen is soft.      Tenderness: There is no abdominal tenderness.   Musculoskeletal:      Cervical back: Neck supple.      Right lower leg: Edema present.      Left lower leg: Edema present.      Comments: Right foot wound    Skin:     General: Skin is warm and dry.      Capillary Refill: Capillary refill takes less than 2 seconds.   Neurological:      General: No focal deficit present.      Mental Status: She is alert.   Psychiatric:         Mood and Affect: Mood normal.            I have reviewed all pertinent lab results within the past 24 hours.    Significant Diagnostics:  I have reviewed all pertinent imaging results/findings within the past 24 hours.

## 2024-02-16 NOTE — SUBJECTIVE & OBJECTIVE
Past Medical History:   Diagnosis Date    Diabetes mellitus     Dialysis patient     Hypertension     Nerve pain     Renal disorder        Past Surgical History:   Procedure Laterality Date    AV FISTULA PLACEMENT Left        Review of patient's allergies indicates:   Allergen Reactions    Pseudoephedrine Shortness Of Breath and Other (See Comments)    Ampicillin     Penicillins     Sudafed cold-allergy      Current Facility-Administered Medications   Medication Frequency    amLODIPine tablet 10 mg Daily    aspirin EC tablet 81 mg Daily    atorvastatin tablet 40 mg QHS    cloNIDine tablet 0.2 mg BID    dextrose 10% bolus 125 mL 125 mL PRN    dextrose 10% bolus 250 mL 250 mL PRN    gabapentin capsule 100 mg Nightly    glucagon (human recombinant) injection 1 mg PRN    glucose chewable tablet 16 g PRN    glucose chewable tablet 24 g PRN    hydrALAZINE tablet 50 mg Q8H    insulin aspart U-100 injection 0-5 Units QID (AC + HS) PRN    labetaloL injection 10 mg Q4H PRN     Current Outpatient Medications   Medication    olmesartan (BENICAR) 40 MG tablet    amLODIPine (NORVASC) 10 MG tablet    aspirin (ECOTRIN) 81 MG EC tablet    atorvastatin (LIPITOR) 40 MG tablet    clindamycin (CLEOCIN) 150 MG capsule    cloNIDine (CATAPRES) 0.2 MG tablet    gabapentin (NEURONTIN) 100 MG capsule    hydrALAZINE (APRESOLINE) 25 MG tablet    ibuprofen (ADVIL,MOTRIN) 600 MG tablet    metoprolol tartrate (LOPRESSOR) 25 MG tablet     Family History    None       Tobacco Use    Smoking status: Every Day     Current packs/day: 0.50     Types: Cigarettes    Smokeless tobacco: Never   Substance and Sexual Activity    Alcohol use: Not on file    Drug use: Never    Sexual activity: Not on file     Review of Systems   Constitutional:  Negative for appetite change and fever.   HENT: Negative.     Respiratory:  Positive for shortness of breath. Negative for cough.    Cardiovascular:  Negative for chest pain.   Gastrointestinal:  Negative for abdominal  pain, nausea and vomiting.   Genitourinary:  Negative for dysuria and hematuria.   Skin:  Positive for wound.   Neurological:  Negative for syncope.   Psychiatric/Behavioral:  Negative for confusion.      Objective:     Vital Signs (Most Recent):  Temp: 98.4 °F (36.9 °C) (02/16/24 0537)  Pulse: 68 (02/16/24 0712)  Resp: 10 (02/16/24 0712)  BP: (!) 195/77 (02/16/24 0712)  SpO2: 96 % (02/16/24 0712) Vital Signs (24h Range):  Temp:  [98.4 °F (36.9 °C)-99.8 °F (37.7 °C)] 98.4 °F (36.9 °C)  Pulse:  [68-81] 68  Resp:  [10-19] 10  SpO2:  [94 %-99 %] 96 %  BP: (147-215)/(65-84) 195/77     Weight: 63.5 kg (140 lb) (02/16/24 1548)  Body mass index is 27.34 kg/m².  Body surface area is 1.64 meters squared.    No intake/output data recorded.     Physical Exam  Eyes:      Pupils: Pupils are equal, round, and reactive to light.   Cardiovascular:      Rate and Rhythm: Normal rate and regular rhythm.   Pulmonary:      Breath sounds: No wheezing or rales.   Abdominal:      Palpations: Abdomen is soft.      Tenderness: There is no abdominal tenderness.   Musculoskeletal:      Cervical back: Neck supple.      Right lower leg: Edema present.      Left lower leg: Edema present.      Comments: Right foot wound has been debrided and is dressed   Neurological:      Mental Status: She is alert.          Significant Labs:  BMP:   Recent Labs   Lab 02/16/24  0610   GLU 97   *   K 5.9*   CL 90*   CO2 22   BUN 76*   CREATININE 9.89*   CALCIUM 9.5     CBC:   Recent Labs   Lab 02/16/24  0610   WBC 5.53   RBC 2.48*   HGB 8.2*   HCT 24.7*   *   MCV 99.6*   MCH 33.1*   MCHC 33.2       Significant Imaging:

## 2024-02-16 NOTE — ASSESSMENT & PLAN NOTE
Patient is currently fluid overloaded with +2 pitting edema of bilateral lower extremities and edema on CXR. Dialysis order for today.     Creatine stable for now. BMP reviewed- noted CrCl cannot be calculated (Unknown ideal weight.). according to latest data. Based on current GFR, CKD stage is end stage.  Monitor UOP and serial BMP and adjust therapy as needed. Renally dose meds. Avoid nephrotoxic medications and procedures.    - Dialysis M/W/F  - Vitamin D level pending   - Mg/Phos pending   - Nephrology consulted for dialysis - Dr. Matias

## 2024-02-16 NOTE — ASSESSMENT & PLAN NOTE
Patient is known to Dr. Corral - [facility]     - ECHO pending (presence of cardiac murmur)  - Continue home medications ASA and Statin

## 2024-02-16 NOTE — H&P
[facility] - Emergency Department  Hospital Medicine  History & Physical    Patient Name: Alissa Dominguez  MRN: [id number]  Patient Class: OP- Observation  Admission Date: 2/16/2024  Attending Physician: Homero Emerson MD   Primary Care Provider: Silke Primary Doctor         Patient information was obtained from patient, past medical records, and ER records.     Subjective:     Principal Problem:Spider bite    Chief Complaint:   Chief Complaint   Patient presents with    Leg Pain     Right Inner Ankle wound/questionable spider bite     Abnormal Lab     Transfer from [facility] for hyperkalemia. Hx of dialysis        HPI: Patient is a 44 year old female who presents from [facility] for hyperkalemia and a right medial ankle spider bite/lesion.The patient states that she noticed it 1 week ago on Friday. The patient states that she was feeding kittens outside her house when a cat reached for her pants. When she looked down, there was a large grey and brown spider on her pants. She went to the ER at [facility] when the spider bite did not improve on Wednesday. She was discharged on Clindamycin and missed dialysis that day. On Thursday, her daughter was sick. Friday morning, she returned to [facility] because she missed dialysis. The patient was transferred from [facility] due to hyperkalemia on labs and her spider bite.      The patient states that she has a PMH of HTN, ESRD on dialysis M/W/F with [facility] via a LT arm AV fistula, and neuropathy. She denies a history of DMII. Her cardiologist is Dr. Corral at [facility]. Her nephrologist is Dr. Matias. The patient states that she does not currently have a PCP, as they have left.     In the ED:   CXR: Patchy airspace opacities scattered throughout the lungs, edema vs infection. Bulky calcifications present on bilateral humerus.   Troponin: 45  BNP: 118,188  CBC: H/H is 8.2/24.7.   CMP: Na 127, K 5.4. Alk Phos 234,  AST 49     An I+D was performed on the right medial ankle in the ED.     The patient was admitted to family medicine service under the direct supervision of Dr. Homero Emerson for the continued care and management of this patient       Past Medical History:   Diagnosis Date    Diabetes mellitus     Dialysis patient     Hypertension     Nerve pain     Renal disorder        Past Surgical History:   Procedure Laterality Date    AV FISTULA PLACEMENT Left        Review of patient's allergies indicates:   Allergen Reactions    Pseudoephedrine Shortness Of Breath and Other (See Comments)    Ampicillin     Penicillins     Sudafed cold-allergy        No current facility-administered medications on file prior to encounter.     Current Outpatient Medications on File Prior to Encounter   Medication Sig    olmesartan (BENICAR) 40 MG tablet Take 40 mg by mouth.    amLODIPine (NORVASC) 10 MG tablet Take 10 mg by mouth once daily.    aspirin (ECOTRIN) 81 MG EC tablet Take 1 tablet (81 mg total) by mouth once daily.    atorvastatin (LIPITOR) 40 MG tablet Take 1 tablet (40 mg total) by mouth every evening.    clindamycin (CLEOCIN) 150 MG capsule Take 2 capsules (300 mg total) by mouth 4 (four) times daily. for 7 days    cloNIDine (CATAPRES) 0.2 MG tablet Take 0.2 mg by mouth 2 (two) times daily.    gabapentin (NEURONTIN) 100 MG capsule Take 1 capsule (100 mg total) by mouth nightly.    hydrALAZINE (APRESOLINE) 25 MG tablet Take 2 tablets (50 mg total) by mouth every 8 (eight) hours.    ibuprofen (ADVIL,MOTRIN) 600 MG tablet Take 1 tablet (600 mg total) by mouth every 8 (eight) hours.    metoprolol tartrate (LOPRESSOR) 25 MG tablet Take 1 tablet (25 mg total) by mouth 2 (two) times daily.    [DISCONTINUED] losartan (COZAAR) 50 MG tablet Take 50 mg by mouth once daily.     Family History    None       Tobacco Use    Smoking status: Every Day     Current packs/day: 0.50     Types: Cigarettes    Smokeless tobacco: Never   Substance and  Sexual Activity    Alcohol use: Not on file    Drug use: Never    Sexual activity: Not on file     Review of Systems   Constitutional:  Positive for fatigue. Negative for chills and fever.   Respiratory:  Negative for cough, shortness of breath and wheezing.    Cardiovascular:  Positive for leg swelling. Negative for chest pain.   Gastrointestinal:  Negative for abdominal pain, diarrhea, nausea and vomiting.   Musculoskeletal:  Positive for myalgias.   Skin:  Positive for color change and wound.   Neurological:  Negative for weakness and headaches.     Objective:     Vital Signs (Most Recent):  Temp: 98.2 °F (36.8 °C) (02/16/24 1555)  Pulse: 82 (02/16/24 1630)  Resp: 16 (02/16/24 1630)  BP: (!) 215/91 (02/16/24 1630)  SpO2: 96 % (02/16/24 0712) Vital Signs (24h Range):  Temp:  [98.2 °F (36.8 °C)-99.8 °F (37.7 °C)] 98.2 °F (36.8 °C)  Pulse:  [68-82] 82  Resp:  [10-19] 16  SpO2:  [94 %-99 %] 96 %  BP: (147-215)/() 215/91     Weight: 63.5 kg (140 lb)  Body mass index is 27.34 kg/m².     Physical Exam  Constitutional:       General: She is not in acute distress.     Appearance: Normal appearance.   HENT:      Head: Normocephalic and atraumatic.      Right Ear: External ear normal.      Left Ear: External ear normal.      Nose: Nose normal.      Mouth/Throat:      Mouth: Mucous membranes are moist.   Eyes:      Extraocular Movements: Extraocular movements intact.      Conjunctiva/sclera: Conjunctivae normal.      Pupils: Pupils are equal, round, and reactive to light.   Cardiovascular:      Rate and Rhythm: Normal rate and regular rhythm.      Pulses: Normal pulses.      Heart sounds: Murmur heard.   Pulmonary:      Effort: Pulmonary effort is normal.      Breath sounds: Normal breath sounds.   Abdominal:      General: Bowel sounds are normal.      Palpations: Abdomen is soft.          Comments: Umbilical hernia noted and easily reducible    Musculoskeletal:         General: Normal range of motion.        Arms:          Legs:       Comments: Spider bite/round lesion (alma delia-sized) noted on her RT medial ankle.     AV fistula noted in LT arm. Palpable thrill and bruit noted    Skin:     General: Skin is warm.   Neurological:      General: No focal deficit present.      Mental Status: She is alert and oriented to person, place, and time.   Psychiatric:         Mood and Affect: Mood normal.         Behavior: Behavior normal.         Thought Content: Thought content normal.         Judgment: Judgment normal.              CRANIAL NERVES     CN III, IV, VI   Pupils are equal, round, and reactive to light.       Significant Labs: All pertinent labs within the past 24 hours have been reviewed.    Significant Imaging: I have reviewed all pertinent imaging results/findings within the past 24 hours.  Assessment/Plan:     * Spider bite  I+D performed in the ED   Vancomycin pharm to dose   Surgery consulted - no plan for surgical intervention       ESRD (end stage renal disease)  Patient is currently fluid overloaded with +2 pitting edema of bilateral lower extremities and edema on CXR. Dialysis order for today.     Creatine stable for now. BMP reviewed- noted CrCl cannot be calculated (Unknown ideal weight.). according to latest data. Based on current GFR, CKD stage is end stage.  Monitor UOP and serial BMP and adjust therapy as needed. Renally dose meds. Avoid nephrotoxic medications and procedures.    - Dialysis M/W/F  - Vitamin D level pending   - Mg/Phos pending   - Nephrology consulted for dialysis - Dr. Matias    Hypertensive urgency  Chronic, uncontrolled. Latest blood pressure and vitals reviewed-     Temp:  [98.4 °F (36.9 °C)-99.8 °F (37.7 °C)]   Pulse:  [68-81]   Resp:  [10-19]   BP: (147-215)/(65-84)   SpO2:  [94 %-99 %] .   Home meds for hypertension were reviewed and noted below.   Hypertension Medications               amLODIPine (NORVASC) 10 MG tablet Take 10 mg by mouth once daily.    cloNIDine (CATAPRES) 0.2 MG tablet Take  0.2 mg by mouth 2 (two) times daily.    hydrALAZINE (APRESOLINE) 25 MG tablet Take 2 tablets (50 mg total) by mouth every 8 (eight) hours.              While in the hospital, will manage blood pressure as follows; Continue home antihypertensive regimen    Will utilize p.r.n. blood pressure medication only if patient's blood pressure greater than 180/110 and she develops symptoms such as worsening chest pain or shortness of breath.    Neuropathy  Continue home medication Gabapentin      History of medication noncompliance  Patient has a history of non-compliance       Coronary artery disease involving native heart  Patient is known to Dr. Corral - [facility]     - ECHO pending (presence of cardiac murmur)  - Continue home medications ASA and Statin     Type 2 diabetes mellitus, without long-term current use of insulin  Patient denies history of DM. HBA1c pending     - SSI PRN         VTE Risk Mitigation (From admission, onward)           Ordered     heparin (porcine) injection 5,000 Units  Every 8 hours         02/16/24 1700     Place LEWIS hose  Until discontinued        Comments: On left leg only    02/16/24 1700     IP VTE LOW RISK PATIENT  Once         02/16/24 1700                           On 02/16/2024, patient should be placed in hospital observation services under my care in collaboration with Dr. Homero Emerson MD.         Antoinette Woodard MD  Department of Hospital Medicine  [facility] - Emergency Department

## 2024-02-16 NOTE — PLAN OF CARE
SS was consulted on pt for securing a ride to dialysis. [place] and should [place] able [place] transit system and also has medicaid. SS left message for pt on her phone the [phone] number to arrange her ride to dialysis. They do require a 3 business day advance on appointments.

## 2024-02-17 VITALS
WEIGHT: 139 LBS | RESPIRATION RATE: 12 BRPM | DIASTOLIC BLOOD PRESSURE: 68 MMHG | HEIGHT: 60 IN | BODY MASS INDEX: 27.29 KG/M2 | OXYGEN SATURATION: 97 % | TEMPERATURE: 99 F | HEART RATE: 65 BPM | SYSTOLIC BLOOD PRESSURE: 174 MMHG

## 2024-02-17 PROBLEM — I16.0 HYPERTENSIVE URGENCY: Status: RESOLVED | Noted: 2021-08-19 | Resolved: 2024-02-17

## 2024-02-17 PROBLEM — Z99.2 END-STAGE RENAL DISEASE ON HEMODIALYSIS: Status: ACTIVE | Noted: 2021-08-19

## 2024-02-17 PROBLEM — Z91.148 HISTORY OF MEDICATION NONCOMPLIANCE: Status: RESOLVED | Noted: 2024-02-16 | Resolved: 2024-02-17

## 2024-02-17 PROBLEM — I10 ESSENTIAL HYPERTENSION: Status: ACTIVE | Noted: 2024-02-17

## 2024-02-17 PROBLEM — E55.9 VITAMIN D DEFICIENCY: Status: ACTIVE | Noted: 2024-02-17

## 2024-02-17 PROBLEM — I50.42 CHRONIC COMBINED SYSTOLIC AND DIASTOLIC HEART FAILURE: Status: ACTIVE | Noted: 2024-02-17

## 2024-02-17 PROBLEM — R06.02 SHORTNESS OF BREATH: Status: RESOLVED | Noted: 2024-02-16 | Resolved: 2024-02-17

## 2024-02-17 LAB
25(OH)D3 SERPL-MCNC: 8 NG/ML
ALBUMIN SERPL BCP-MCNC: 2.7 G/DL (ref 3.5–5)
ALBUMIN/GLOB SERPL: 0.8 {RATIO}
ALP SERPL-CCNC: 202 U/L (ref 37–98)
ALT SERPL W P-5'-P-CCNC: 17 U/L (ref 13–56)
ANION GAP SERPL CALCULATED.3IONS-SCNC: 10 MMOL/L (ref 7–16)
AORTIC ROOT ANNULUS: 2.09 CM
AST SERPL W P-5'-P-CCNC: 20 U/L (ref 15–37)
AV INDEX (PROSTH): 0.41
AV MEAN GRADIENT: 11 MMHG
AV PEAK GRADIENT: 25 MMHG
AV VALVE AREA BY VELOCITY RATIO: 1.19 CM²
AV VALVE AREA: 1.3 CM²
AV VELOCITY RATIO: 0.38
BASOPHILS # BLD AUTO: 0.03 K/UL (ref 0–0.2)
BASOPHILS NFR BLD AUTO: 0.7 % (ref 0–1)
BILIRUB SERPL-MCNC: 0.9 MG/DL (ref ?–1.2)
BSA FOR ECHO PROCEDURE: 1.63 M2
BUN SERPL-MCNC: 35 MG/DL (ref 7–18)
BUN/CREAT SERPL: 6 (ref 6–20)
CALCIUM SERPL-MCNC: 8.9 MG/DL (ref 8.5–10.1)
CHLORIDE SERPL-SCNC: 99 MMOL/L (ref 98–107)
CO2 SERPL-SCNC: 28 MMOL/L (ref 21–32)
CREAT SERPL-MCNC: 5.94 MG/DL (ref 0.55–1.02)
CV ECHO LV RWT: 0.64 CM
DIFFERENTIAL METHOD BLD: ABNORMAL
DOP CALC AO PEAK VEL: 2.5 M/S
DOP CALC AO VTI: 58.5 CM
DOP CALC LVOT AREA: 3.2 CM2
DOP CALC LVOT DIAMETER: 2.01 CM
DOP CALC LVOT PEAK VEL: 0.94 M/S
DOP CALC LVOT STROKE VOLUME: 75.8 CM3
DOP CALC MV VTI: 48.3 CM
DOP CALCLVOT PEAK VEL VTI: 23.9 CM
E WAVE DECELERATION TIME: 221.54 MSEC
E/A RATIO: 2.38
E/E' RATIO: 24.38 M/S
ECHO LV POSTERIOR WALL: 1.5 CM (ref 0.6–1.1)
EGFR (NO RACE VARIABLE) (RUSH/TITUS): 9 ML/MIN/1.73M2
EOSINOPHIL # BLD AUTO: 0.37 K/UL (ref 0–0.5)
EOSINOPHIL NFR BLD AUTO: 8.9 % (ref 1–4)
ERYTHROCYTE [DISTWIDTH] IN BLOOD BY AUTOMATED COUNT: 16.4 % (ref 11.5–14.5)
FRACTIONAL SHORTENING: 21 % (ref 28–44)
GLOBAL LONGITUIDAL STRAIN: -12.5 %
GLOBULIN SER-MCNC: 3.3 G/DL (ref 2–4)
GLUCOSE SERPL-MCNC: 107 MG/DL (ref 74–106)
GLUCOSE SERPL-MCNC: 111 MG/DL (ref 70–105)
GLUCOSE SERPL-MCNC: 139 MG/DL (ref 70–105)
HCT VFR BLD AUTO: 24.3 % (ref 38–47)
HGB BLD-MCNC: 7.7 G/DL (ref 12–16)
IMM GRANULOCYTES # BLD AUTO: 0.02 K/UL (ref 0–0.04)
IMM GRANULOCYTES NFR BLD: 0.5 % (ref 0–0.4)
INTERVENTRICULAR SEPTUM: 1.4 CM (ref 0.6–1.1)
IVC DIAMETER: 0.99 CM
LEFT ATRIUM SIZE: 4.2 CM
LEFT INTERNAL DIMENSION IN SYSTOLE: 3.69 CM (ref 2.1–4)
LEFT VENTRICLE DIASTOLIC VOLUME INDEX: 84.93 ML/M2
LEFT VENTRICLE DIASTOLIC VOLUME: 135.88 ML
LEFT VENTRICLE MASS INDEX: 175 G/M2
LEFT VENTRICLE SYSTOLIC VOLUME INDEX: 36.1 ML/M2
LEFT VENTRICLE SYSTOLIC VOLUME: 57.7 ML
LEFT VENTRICULAR INTERNAL DIMENSION IN DIASTOLE: 4.7 CM (ref 3.5–6)
LEFT VENTRICULAR MASS: 279.45 G
LV LATERAL E/E' RATIO: 17.73 M/S
LV SEPTAL E/E' RATIO: 39 M/S
LVOT MG: 1.83 MMHG
LVOT MV: 0.65 CM/S
LYMPHOCYTES # BLD AUTO: 0.51 K/UL (ref 1–4.8)
LYMPHOCYTES NFR BLD AUTO: 12.3 % (ref 27–41)
MAGNESIUM SERPL-MCNC: 2.4 MG/DL (ref 1.7–2.3)
MCH RBC QN AUTO: 32.8 PG (ref 27–31)
MCHC RBC AUTO-ENTMCNC: 31.7 G/DL (ref 32–36)
MCV RBC AUTO: 103.4 FL (ref 80–96)
MONOCYTES # BLD AUTO: 0.39 K/UL (ref 0–0.8)
MONOCYTES NFR BLD AUTO: 9.4 % (ref 2–6)
MPC BLD CALC-MCNC: 9.7 FL (ref 9.4–12.4)
MV MEAN GRADIENT: 5 MMHG
MV PEAK A VEL: 0.82 M/S
MV PEAK E VEL: 1.95 M/S
MV PEAK GRADIENT: 15 MMHG
MV STENOSIS PRESSURE HALF TIME: 64.25 MS
MV VALVE AREA BY CONTINUITY EQUATION: 1.57 CM2
MV VALVE AREA P 1/2 METHOD: 3.42 CM2
NEUTROPHILS # BLD AUTO: 2.84 K/UL (ref 1.8–7.7)
NEUTROPHILS NFR BLD AUTO: 68.2 % (ref 53–65)
NRBC # BLD AUTO: 0 X10E3/UL
NRBC, AUTO (.00): 0 %
OHS LV EJECTION FRACTION SIMPSONS BIPLANE MOD: 45 %
PHOSPHATE SERPL-MCNC: 7.4 MG/DL (ref 2.5–4.5)
PISA TR MAX VEL: 3.39 M/S
PLATELET # BLD AUTO: 121 K/UL (ref 150–400)
POTASSIUM SERPL-SCNC: 5 MMOL/L (ref 3.5–5.1)
PROT SERPL-MCNC: 6 G/DL (ref 6.4–8.2)
PV PEAK GRADIENT: 14 MMHG
PV PEAK VELOCITY: 1.88 M/S
RA PRESSURE ESTIMATED: 3 MMHG
RA VOL SYS: 58.01 ML
RBC # BLD AUTO: 2.35 M/UL (ref 4.2–5.4)
RIGHT ATRIAL AREA: 19.2 CM2
RIGHT VENTRICULAR END-DIASTOLIC DIMENSION: 4.2 CM
RIGHT VENTRICULAR LENGTH IN DIASTOLE (APICAL 4-CHAMBER VIEW): 7.31 CM
RV MID DIAMA: 3.19 CM
RV TB RVSP: 6 MMHG
SODIUM SERPL-SCNC: 132 MMOL/L (ref 136–145)
TDI LATERAL: 0.11 M/S
TDI SEPTAL: 0.05 M/S
TDI: 0.08 M/S
TR MAX PG: 46 MMHG
TRICUSPID ANNULAR PLANE SYSTOLIC EXCURSION: 1.84 CM
TV REST PULMONARY ARTERY PRESSURE: 49 MMHG
WBC # BLD AUTO: 4.16 K/UL (ref 4.5–11)
Z-SCORE OF LEFT VENTRICULAR DIMENSION IN END DIASTOLE: 0.32
Z-SCORE OF LEFT VENTRICULAR DIMENSION IN END SYSTOLE: 2.12

## 2024-02-17 PROCEDURE — 96376 TX/PRO/DX INJ SAME DRUG ADON: CPT | Mod: 59

## 2024-02-17 PROCEDURE — 96372 THER/PROPH/DIAG INJ SC/IM: CPT | Mod: 59

## 2024-02-17 PROCEDURE — 85025 COMPLETE CBC W/AUTO DIFF WBC: CPT

## 2024-02-17 PROCEDURE — 82306 VITAMIN D 25 HYDROXY: CPT | Performed by: HOSPITALIST

## 2024-02-17 PROCEDURE — 63600175 PHARM REV CODE 636 W HCPCS

## 2024-02-17 PROCEDURE — 63600175 PHARM REV CODE 636 W HCPCS: Mod: JZ,JG

## 2024-02-17 PROCEDURE — 25000003 PHARM REV CODE 250

## 2024-02-17 PROCEDURE — 83036 HEMOGLOBIN GLYCOSYLATED A1C: CPT | Mod: 90 | Performed by: HOSPITALIST

## 2024-02-17 PROCEDURE — 80053 COMPREHEN METABOLIC PANEL: CPT

## 2024-02-17 PROCEDURE — 84100 ASSAY OF PHOSPHORUS: CPT | Performed by: HOSPITALIST

## 2024-02-17 PROCEDURE — G0378 HOSPITAL OBSERVATION PER HR: HCPCS

## 2024-02-17 PROCEDURE — 82962 GLUCOSE BLOOD TEST: CPT | Mod: 91

## 2024-02-17 PROCEDURE — 99239 HOSP IP/OBS DSCHRG MGMT >30: CPT | Mod: ,,, | Performed by: HOSPITALIST

## 2024-02-17 PROCEDURE — 97161 PT EVAL LOW COMPLEX 20 MIN: CPT

## 2024-02-17 PROCEDURE — 97165 OT EVAL LOW COMPLEX 30 MIN: CPT

## 2024-02-17 PROCEDURE — 25000003 PHARM REV CODE 250: Performed by: INTERNAL MEDICINE

## 2024-02-17 PROCEDURE — 83735 ASSAY OF MAGNESIUM: CPT | Performed by: HOSPITALIST

## 2024-02-17 PROCEDURE — 63600175 PHARM REV CODE 636 W HCPCS: Mod: JZ | Performed by: INTERNAL MEDICINE

## 2024-02-17 RX ORDER — HYDROCODONE BITARTRATE AND ACETAMINOPHEN 10; 325 MG/1; MG/1
1 TABLET ORAL EVERY 6 HOURS PRN
Qty: 12 TABLET | Refills: 0 | Status: SHIPPED | OUTPATIENT
Start: 2024-02-17

## 2024-02-17 RX ORDER — ATORVASTATIN CALCIUM 40 MG/1
40 TABLET, FILM COATED ORAL NIGHTLY
Qty: 90 TABLET | Refills: 3
Start: 2024-02-17 | End: 2025-02-16

## 2024-02-17 RX ORDER — CLONIDINE HYDROCHLORIDE 0.2 MG/1
0.2 TABLET ORAL 3 TIMES DAILY
Status: DISCONTINUED | OUTPATIENT
Start: 2024-02-17 | End: 2024-02-17 | Stop reason: HOSPADM

## 2024-02-17 RX ORDER — HYDRALAZINE HYDROCHLORIDE 100 MG/1
100 TABLET, FILM COATED ORAL EVERY 8 HOURS
Qty: 90 TABLET | Refills: 11 | Status: SHIPPED | OUTPATIENT
Start: 2024-02-17 | End: 2025-02-16

## 2024-02-17 RX ORDER — MORPHINE SULFATE 4 MG/ML
4 INJECTION, SOLUTION INTRAMUSCULAR; INTRAVENOUS
Status: COMPLETED | OUTPATIENT
Start: 2024-02-17 | End: 2024-02-17

## 2024-02-17 RX ORDER — HYDROCODONE BITARTRATE AND ACETAMINOPHEN 10; 325 MG/1; MG/1
1 TABLET ORAL EVERY 6 HOURS PRN
Status: DISCONTINUED | OUTPATIENT
Start: 2024-02-17 | End: 2024-02-17 | Stop reason: HOSPADM

## 2024-02-17 RX ORDER — CARVEDILOL 6.25 MG/1
6.25 TABLET ORAL 2 TIMES DAILY WITH MEALS
Qty: 60 TABLET | Refills: 5 | Status: SHIPPED | OUTPATIENT
Start: 2024-02-17 | End: 2025-02-16

## 2024-02-17 RX ORDER — LOSARTAN POTASSIUM 100 MG/1
100 TABLET ORAL DAILY
Status: DISCONTINUED | OUTPATIENT
Start: 2024-02-17 | End: 2024-02-17 | Stop reason: HOSPADM

## 2024-02-17 RX ORDER — CHOLECALCIFEROL (VITAMIN D3) 25 MCG
1000 TABLET ORAL DAILY
Qty: 30 TABLET | Refills: 5 | Status: SHIPPED | OUTPATIENT
Start: 2024-02-17

## 2024-02-17 RX ORDER — HYDRALAZINE HYDROCHLORIDE 100 MG/1
100 TABLET, FILM COATED ORAL EVERY 8 HOURS
Status: DISCONTINUED | OUTPATIENT
Start: 2024-02-17 | End: 2024-02-17 | Stop reason: HOSPADM

## 2024-02-17 RX ORDER — ASPIRIN 81 MG/1
81 TABLET ORAL DAILY
Refills: 0
Start: 2024-02-17 | End: 2025-02-16

## 2024-02-17 RX ADMIN — HYDRALAZINE HYDROCHLORIDE 50 MG: 50 TABLET, FILM COATED ORAL at 02:02

## 2024-02-17 RX ADMIN — CLONIDINE HYDROCHLORIDE 0.2 MG: 0.1 TABLET ORAL at 08:02

## 2024-02-17 RX ADMIN — AMLODIPINE BESYLATE 10 MG: 10 TABLET ORAL at 08:02

## 2024-02-17 RX ADMIN — LOSARTAN POTASSIUM 100 MG: 50 TABLET, FILM COATED ORAL at 10:02

## 2024-02-17 RX ADMIN — ASPIRIN 81 MG: 81 TABLET, COATED ORAL at 08:02

## 2024-02-17 RX ADMIN — HYDRALAZINE HYDROCHLORIDE 50 MG: 50 TABLET, FILM COATED ORAL at 05:02

## 2024-02-17 RX ADMIN — HYDROCODONE BITARTRATE AND ACETAMINOPHEN 1 TABLET: 5; 325 TABLET ORAL at 05:02

## 2024-02-17 RX ADMIN — MORPHINE SULFATE 4 MG: 4 INJECTION, SOLUTION INTRAMUSCULAR; INTRAVENOUS at 03:02

## 2024-02-17 RX ADMIN — HEPARIN SODIUM 5000 UNITS: 5000 INJECTION, SOLUTION INTRAVENOUS; SUBCUTANEOUS at 02:02

## 2024-02-17 RX ADMIN — HEPARIN SODIUM 5000 UNITS: 5000 INJECTION, SOLUTION INTRAVENOUS; SUBCUTANEOUS at 05:02

## 2024-02-17 RX ADMIN — LABETALOL HYDROCHLORIDE 20 MG: 5 INJECTION INTRAVENOUS at 01:02

## 2024-02-17 RX ADMIN — HYDROCODONE BITARTRATE AND ACETAMINOPHEN 1 TABLET: 5; 325 TABLET ORAL at 12:02

## 2024-02-17 NOTE — HOSPITAL COURSE
02/16 Saw spider that bite her and sore on ankle. Treat for abscess. Surgery seen  Cover ATB     Missed last dialysis 02/14. Talked with Dr Matias underwent dialysis 02/16.  Was given vancomycin intravenously after dialysis.    02/17 leg sore but otherwise better.  Blood pressure up and medications adjusted by Dr. Matias.  Patient is able to ambulate on foot although sore.  Feels she could care for herself.  Patient wanting to go home  Discussed with Dr. Matias and Dr. Martin  Will discharge home.  Patient to do daily dressings on her wound and clean with soap and water  To follow up mid week at [facility] rechecked wounds  Keep dialysis as previous Monday Wednesday and Friday  Dr. Matias to re-dose vancomycin earlier in the week at dialysis unit  Wound culture growing staph aureus but sensitivities still pending  Discharged    Patient counseled on the importance of keeping all hospital follow up appointments. Stressed compliance with medications, therapies, or devices as prescribed in order to provide for the best health outcomes and decrease the risk of reoccurrence or further progression of issues.    Additionally, patient given written literature regarding their current disease processes and home care recommendations.   Patient given opportunity to ask questions and verbalized understanding of all information discussed.  Reinforced patient's primary care provider can be a big assets in monitoring and organizing issues after discharge.

## 2024-02-17 NOTE — PLAN OF CARE
Problem: Occupational Therapy  Goal: Occupational Therapy Goal  Description: OT evaluation completed. See eval for details. Pt is an eval only. Pt presents with no significant decline with ability to perform self care activities. Pt demonstrated (I) with LE dressing  and reports no decline with bathing. Pt reports being able to take care of herself and stated she  does not need therapy. No skilled OT indicated.  Outcome: Met

## 2024-02-17 NOTE — PROGRESS NOTES
[facility] - Emergency Department  Hospital Medicine  Progress Note    Patient Name: Alissa Dominguez  MRN: [id number]  Patient Class: OP- Observation   Admission Date: 2/16/2024  Length of Stay: 0 days  Attending Physician: Homero Emerson MD  Primary Care Provider: Silke, Primary Doctor        Subjective:     Principal Problem:Spider bite        HPI:  Patient is a 44 year old female who presents from [facility] for hyperkalemia and a right medial ankle spider bite/lesion.The patient states that she noticed it 1 week ago on Friday. The patient states that she was feeding kittens outside her house when a cat reached for her pants. When she looked down, there was a large grey and brown spider on her pants. She went to the ER at [facility] when the spider bite did not improve on Wednesday. She was discharged on Clindamycin and missed dialysis that day. On Thursday, her daughter was sick. Friday morning, she returned to [facility] because she missed dialysis. The patient was transferred from [facility] due to hyperkalemia on labs and her spider bite.      The patient states that she has a PMH of HTN, ESRD on dialysis M/W/F with [facility] via a LT arm AV fistula, and neuropathy. She denies a history of DMII. Her cardiologist is Dr. Corral at [facility]. Her nephrologist is Dr. Matias. The patient states that she does not currently have a PCP, as they have left.     In the ED:   CXR: Patchy airspace opacities scattered throughout the lungs, edema vs infection. Bulky calcifications present on bilateral humerus.   Troponin: 45  BNP: 118,188  CBC: H/H is 8.2/24.7.   CMP: Na 127, K 5.4. Alk Phos 234, AST 49     An I+D was performed on the right medial ankle in the ED.     The patient was admitted to family medicine service under the direct supervision of Dr. Homero Emerson for the continued care and management of this patient       Overview/Hospital Course:  No notes on file    Interval  History: 2/17/2024; Pt examined at bedside. Endorses continued leg pain. Pt has persistently elevated BP. She was given PRN labetalol twice due to BP of 206/78 & 216/75. Added losartan 100 mg. Plan for dialysis today. Surgery is monitoring wound.     Review of Systems   Constitutional:  Positive for fatigue. Negative for chills and fever.   Respiratory:  Negative for cough, shortness of breath and wheezing.    Cardiovascular:  Positive for leg swelling. Negative for chest pain.   Gastrointestinal:  Negative for abdominal pain, diarrhea, nausea and vomiting.   Musculoskeletal:  Positive for myalgias.   Skin:  Positive for color change and wound.   Neurological:  Negative for weakness and headaches.     Objective:     Vital Signs (Most Recent):  Temp: 98.6 °F (37 °C) (02/17/24 0706)  Pulse: 80 (02/17/24 0845)  Resp: 12 (02/17/24 0845)  BP: (!) 216/75 (02/17/24 0845)  SpO2: 99 % (02/17/24 0845) Vital Signs (24h Range):  Temp:  [98.2 °F (36.8 °C)-99.1 °F (37.3 °C)] 98.6 °F (37 °C)  Pulse:  [] 80  Resp:  [8-18] 12  SpO2:  [94 %-100 %] 99 %  BP: (180-224)/() 216/75     Weight: 63 kg (139 lb)  Body mass index is 27.15 kg/m².    Intake/Output Summary (Last 24 hours) at 2/17/2024 0857  Last data filed at 2/17/2024 0530  Gross per 24 hour   Intake 600 ml   Output 3200 ml   Net -2600 ml         Physical Exam  Constitutional:       General: She is not in acute distress.     Appearance: Normal appearance.   HENT:      Head: Normocephalic and atraumatic.      Right Ear: External ear normal.      Left Ear: External ear normal.      Nose: Nose normal.      Mouth/Throat:      Mouth: Mucous membranes are moist.   Eyes:      Extraocular Movements: Extraocular movements intact.      Conjunctiva/sclera: Conjunctivae normal.      Pupils: Pupils are equal, round, and reactive to light.   Cardiovascular:      Rate and Rhythm: Normal rate and regular rhythm.      Pulses: Normal pulses.      Heart sounds: Murmur heard.    Pulmonary:      Effort: Pulmonary effort is normal.      Breath sounds: Normal breath sounds.   Abdominal:      General: Bowel sounds are normal.      Palpations: Abdomen is soft.          Comments: Umbilical hernia noted and easily reducible    Musculoskeletal:         General: Normal range of motion.        Arms:         Legs:       Comments: Spider bite/round lesion (alma delia-sized) noted on her RT medial ankle.     AV fistula noted in LT arm. Palpable thrill and bruit noted    Skin:     General: Skin is warm.   Neurological:      General: No focal deficit present.      Mental Status: She is alert and oriented to person, place, and time.   Psychiatric:         Mood and Affect: Mood normal.         Behavior: Behavior normal.         Thought Content: Thought content normal.         Judgment: Judgment normal.             Significant Labs: All pertinent labs within the past 24 hours have been reviewed.    Significant Imaging: I have reviewed all pertinent imaging results/findings within the past 24 hours.    Assessment/Plan:      * Spider bite  I+D performed in the ED   Vancomycin pharm to dose   Surgery consulted - no plan for surgical intervention       Shortness of breath    Sp02 98% with O2 via NC    Neuropathy  Continue home medication Gabapentin      History of medication noncompliance  Patient has a history of non-compliance       Wound of right lower extremity    As above    Coronary artery disease involving native heart  Patient is known to Dr. Corral - [facility]     - ECHO pending (presence of cardiac murmur)  - Continue home medications ASA and Statin     Type 2 diabetes mellitus, without long-term current use of insulin  Patient denies history of DM. HBA1c pending     - SSI PRN       Hypertensive urgency  Chronic, uncontrolled. Latest blood pressure and vitals reviewed-     Temp:  [98.2 °F (36.8 °C)-99.1 °F (37.3 °C)]   Pulse:  []   Resp:  [7-18]   BP: (177-224)/()   SpO2:  [94 %-100 %] .   Home meds  for hypertension were reviewed and noted below.   Hypertension Medications               amLODIPine (NORVASC) 10 MG tablet Take 10 mg by mouth once daily.    cloNIDine (CATAPRES) 0.2 MG tablet Take 0.2 mg by mouth 2 (two) times daily.    hydrALAZINE (APRESOLINE) 25 MG tablet Take 2 tablets (50 mg total) by mouth every 8 (eight) hours.              While in the hospital, will manage blood pressure as follows; Continue home antihypertensive regimen    Will utilize p.r.n. blood pressure medication only if patient's blood pressure greater than 180/110 and she develops symptoms such as worsening chest pain or shortness of breath.    2/17  BP remains elevated /78 & 216/75. Pt given PRN labetalol and started losartan (alternative for olmesartan)  Consulting nephrology about HTN    ESRD (end stage renal disease)  Patient is currently fluid overloaded with +2 pitting edema of bilateral lower extremities and edema on CXR. Dialysis order for today.     Creatine stable for now. BMP reviewed- noted Estimated Creatinine Clearance: 10.2 mL/min (A) (based on SCr of 5.94 mg/dL (H)). according to latest data. Based on current GFR, CKD stage is end stage.  Monitor UOP and serial BMP and adjust therapy as needed. Renally dose meds. Avoid nephrotoxic medications and procedures.    - Dialysis M/W/F  - Vitamin D 8.0  - Mg/Phos 2.4/7.4  - Nephrology consulted for dialysis - Dr. Matias      VTE Risk Mitigation (From admission, onward)           Ordered     heparin (porcine) injection 5,000 Units  Every 8 hours         02/16/24 1700     Place LEWIS hose  Until discontinued        Comments: On left leg only    02/16/24 1700     IP VTE LOW RISK PATIENT  Once         02/16/24 1700                    Discharge Planning   RIAN:      Code Status: Full Code   Is the patient medically ready for discharge?:     Reason for patient still in hospital (select all that apply): Treatment                     Chery Mariano MD  Department of Hospital  Medicine   [facility] - Emergency Department

## 2024-02-17 NOTE — SUBJECTIVE & OBJECTIVE
Interval History: 2/17/2024; Pt examined at bedside. Endorses continued leg pain. Pt has persistently elevated BP. She was given PRN labetalol twice due to BP of 206/78 & 216/75. Added losartan 100 mg. Plan for dialysis today. Surgery is monitoring wound.     Review of Systems   Constitutional:  Positive for fatigue. Negative for chills and fever.   Respiratory:  Negative for cough, shortness of breath and wheezing.    Cardiovascular:  Positive for leg swelling. Negative for chest pain.   Gastrointestinal:  Negative for abdominal pain, diarrhea, nausea and vomiting.   Musculoskeletal:  Positive for myalgias.   Skin:  Positive for color change and wound.   Neurological:  Negative for weakness and headaches.     Objective:     Vital Signs (Most Recent):  Temp: 98.6 °F (37 °C) (02/17/24 0706)  Pulse: 80 (02/17/24 0845)  Resp: 12 (02/17/24 0845)  BP: (!) 216/75 (02/17/24 0845)  SpO2: 99 % (02/17/24 0845) Vital Signs (24h Range):  Temp:  [98.2 °F (36.8 °C)-99.1 °F (37.3 °C)] 98.6 °F (37 °C)  Pulse:  [] 80  Resp:  [8-18] 12  SpO2:  [94 %-100 %] 99 %  BP: (180-224)/() 216/75     Weight: 63 kg (139 lb)  Body mass index is 27.15 kg/m².    Intake/Output Summary (Last 24 hours) at 2/17/2024 0857  Last data filed at 2/17/2024 0530  Gross per 24 hour   Intake 600 ml   Output 3200 ml   Net -2600 ml         Physical Exam  Constitutional:       General: She is not in acute distress.     Appearance: Normal appearance.   HENT:      Head: Normocephalic and atraumatic.      Right Ear: External ear normal.      Left Ear: External ear normal.      Nose: Nose normal.      Mouth/Throat:      Mouth: Mucous membranes are moist.   Eyes:      Extraocular Movements: Extraocular movements intact.      Conjunctiva/sclera: Conjunctivae normal.      Pupils: Pupils are equal, round, and reactive to light.   Cardiovascular:      Rate and Rhythm: Normal rate and regular rhythm.      Pulses: Normal pulses.      Heart sounds: Murmur heard.    Pulmonary:      Effort: Pulmonary effort is normal.      Breath sounds: Normal breath sounds.   Abdominal:      General: Bowel sounds are normal.      Palpations: Abdomen is soft.          Comments: Umbilical hernia noted and easily reducible    Musculoskeletal:         General: Normal range of motion.        Arms:         Legs:       Comments: Spider bite/round lesion (alma delia-sized) noted on her RT medial ankle.     AV fistula noted in LT arm. Palpable thrill and bruit noted    Skin:     General: Skin is warm.   Neurological:      General: No focal deficit present.      Mental Status: She is alert and oriented to person, place, and time.   Psychiatric:         Mood and Affect: Mood normal.         Behavior: Behavior normal.         Thought Content: Thought content normal.         Judgment: Judgment normal.             Significant Labs: All pertinent labs within the past 24 hours have been reviewed.    Significant Imaging: I have reviewed all pertinent imaging results/findings within the past 24 hours.

## 2024-02-17 NOTE — PLAN OF CARE
Problem: Device-Related Complication Risk (Hemodialysis)  Goal: Safe, Effective Therapy Delivery  Outcome: Ongoing, Progressing     Problem: Hemodynamic Instability (Hemodialysis)  Goal: Effective Tissue Perfusion  Outcome: Ongoing, Progressing     Problem: Infection (Hemodialysis)  Goal: Absence of Infection Signs and Symptoms  Outcome: Ongoing, Progressing     Problem: Adult Inpatient Plan of Care  Goal: Plan of Care Review  Outcome: Ongoing, Progressing  Goal: Patient-Specific Goal (Individualized)  Outcome: Ongoing, Progressing

## 2024-02-17 NOTE — DISCHARGE SUMMARY
[facility]  [facility] Medicine  Discharge Summary      Patient Name: Alissa Dominguez  MRN: [id number]  DAGMAR: [id number]  Patient Class: OP- Observation  Admission Date: 2/16/2024  Hospital Length of Stay: 0 days  Discharge Date and Time:  02/17/2024 5:23 PM  Attending Physician: Homero Emerson MD   Discharging Provider: Homero Emerson MD  Primary Care Provider: No, Primary Doctor    Primary Care Team: Networked reference to record PCT     HPI:   Patient is a 44 year old female who presents from [facility] for hyperkalemia and a right medial ankle spider bite/lesion.The patient states that she noticed it 1 week ago on Friday. The patient states that she was feeding kittens outside her house when a cat reached for her pants. When she looked down, there was a large grey and brown spider on her pants. She went to the ER at [facility] when the spider bite did not improve on Wednesday. She was discharged on Clindamycin and missed dialysis that day. On Thursday, her daughter was sick. Friday morning, she returned to [facility] because she missed dialysis. The patient was transferred from [facility] due to hyperkalemia on labs and her spider bite.      The patient states that she has a PMH of HTN, ESRD on dialysis M/W/F with [facility] via a LT arm AV fistula, and neuropathy. She denies a history of DMII. Her cardiologist is Dr. Corral at [facility]. Her nephrologist is Dr. Matias. The patient states that she does not currently have a PCP, as they have left.     In the ED:   CXR: Patchy airspace opacities scattered throughout the lungs, edema vs infection. Bulky calcifications present on bilateral humerus.   Troponin: 45  BNP: 118,188  CBC: H/H is 8.2/24.7.   CMP: Na 127, K 5.4. Alk Phos 234, AST 49     An I+D was performed on the right medial ankle in the ED.     The patient was admitted to family medicine service under the direct supervision of Dr. Homero Emerson for  the continued care and management of this patient       * No surgery found *      Hospital Course:   02/16 Saw spider that bite her and sore on ankle. Treat for abscess. Surgery seen  Cover ATB     Missed last dialysis 02/14. Talked with Dr Matias underwent dialysis 02/16.  Was given vancomycin intravenously after dialysis.    02/17 leg sore but otherwise better.  Blood pressure up and medications adjusted by Dr. Matias.  Patient is able to ambulate on foot although sore.  Feels she could care for herself.  Patient wanting to go home  Discussed with Dr. Matias and Dr. Martin  Will discharge home.  Patient to do daily dressings on her wound and clean with soap and water  To follow up mid week at [facility] rechecked wounds  Keep dialysis as previous Monday Wednesday and Friday  Dr. Matias to re-dose vancomycin earlier in the week at dialysis unit  Wound culture growing staph aureus but sensitivities still pending  Discharged    Patient counseled on the importance of keeping all hospital follow up appointments. Stressed compliance with medications, therapies, or devices as prescribed in order to provide for the best health outcomes and decrease the risk of reoccurrence or further progression of issues.    Additionally, patient given written literature regarding their current disease processes and home care recommendations.   Patient given opportunity to ask questions and verbalized understanding of all information discussed.  Reinforced patient's primary care provider can be a big assets in monitoring and organizing issues after discharge.          Goals of Care Treatment Preferences:  Code Status: Full Code      Consults:   Consults (From admission, onward)          Status Ordering Provider     Inpatient consult to Social Work  Once        Provider:  (Not yet assigned)    Completed ONELIA CALLE     Pharmacy to dose Vancomycin consult  Once        Provider:  (Not yet assigned)    Acknowledged ONELIA CALLE      Inpatient consult to Nephrology  Once        Provider:  Carl Matias MD    Acknowledged ALVA ONELIA            Neuro  Neuropathy  Continue home medication Gabapentin      Cardiac/Vascular  Chronic combined systolic and diastolic heart failure    Follow up with Cardiology outpatient    Essential hypertension  Chronic, uncontrolled. Latest blood pressure and vitals reviewed-     Temp:  [98.6 °F (37 °C)-99.1 °F (37.3 °C)]   Pulse:  []   Resp:  [7-18]   BP: (174-224)/(64-98)   SpO2:  [85 %-100 %] .   Home meds for hypertension were reviewed and noted below.   Hypertension Medications               amLODIPine (NORVASC) 10 MG tablet Take 10 mg by mouth once daily.    cloNIDine (CATAPRES) 0.2 MG tablet Take 0.2 mg by mouth 2 (two) times daily.    hydrALAZINE (APRESOLINE) 50 MG tablet Take 2 tablets by mouth every 8 (eight) hours.    olmesartan (BENICAR) 40 MG tablet Take 40 mg by mouth once daily.            While in the hospital, will manage blood pressure as follows; Continue home antihypertensive regimen    Will utilize p.r.n. blood pressure medication only if patient's blood pressure greater than 180/110 and she develops symptoms such as worsening chest pain or shortness of breath.    02/17 Dr. Matias made adjustment in blood pressure medications  Continue to follow output    Pulmonary HTN    Discuss with Cardiology when follows up outpatient  See echocardiogram report    Coronary artery disease involving native heart  Patient is known to Dr. Corral - [facility]     - ECHO pending (presence of cardiac murmur)  - Continue home medications ASA and Statin     02/17  echo Summary         Left Ventricle: The left ventricle is normal in size. Increased wall thickness. There is severe concentric hypertrophy. There is mildly reduced systolic function. Biplane (2D) method of discs ejection fraction is 45%. Global longitudinal strain is reduced. Grade III diastolic dysfunction.    Right Ventricle: Right ventricular  enlargement. Systolic function is normal.    Left Atrium: Left atrium is moderately dilated.    Right Atrium: Right atrium is mildly dilated.    Aortic Valve: The aortic valve is a trileaflet valve. Mildly calcified cusps. 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 There is no significant regurgitation. If there is clinical suspicion for endocarditis, please check blood cultures x2.    Mitral Valve: There is bileaflet sclerosis. There is mitral annular calcification present.    Tricuspid Valve: There is mild regurgitation.    Pulmonary Artery: The estimated pulmonary artery systolic pressure is 49 mmHg.    IVC/SVC: Normal venous pressure at 3 mmHg.    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 There is no significant regurgitation. Less likely to be vegetation however, if there is clinical suspicion or risk factors for endocarditis, please check blood cultures x2.      Have patient follow up with Cardiology  No reason to think endocarditis, patient has had no fever and lesion on foot is new       Renal/  End-stage renal disease on hemodialysis  Patient is currently fluid overloaded with +2 pitting edema of bilateral lower extremities and edema on CXR. Dialysis order for today.     Creatine stable for now. BMP reviewed- noted Estimated Creatinine Clearance: 10.2 mL/min (A) (based on SCr of 5.94 mg/dL (H)). according to latest data. Based on current GFR, CKD stage is end stage.  Monitor UOP and serial BMP and adjust therapy as needed. Renally dose meds. Avoid nephrotoxic medications and procedures.    - Dialysis M/W/F  - Vitamin D 8.0  - Mg/Phos 2.4/7.4  - Nephrology consulted for dialysis - Dr. Matias    Endocrine  Vitamin D deficiency    Replace    Type 2 diabetes mellitus, without long-term current use of insulin  Patient denies history of DM. HBA1c pending    Blood sugar low 100s off medication    - SSI PRN       Orthopedic  Wound of right lower extremity    As above    Dr. Matias to re-dose vancomycin as outpatient    Other  * Spider bite  I+D performed in the ED   Vancomycin pharm to dose   Surgery consulted - no plan for surgical intervention     02/17 wound culture growing staph aureus with sensitivity pending    Microbiology Results (last 7 days)       Procedure Component Value Units Date/Time    Culture, Wound [[id number]]  (Abnormal) Collected: 02/16/24 0630    Order Status: Completed Specimen: Wound from Ankle, Right Updated: 02/17/24 1014     Culture, Wound/Abscess Heavy Growth Staphylococcus aureus     Comment: Susceptibility To Follow       Culture, Anaerobe [[id number]] Collected: 02/16/24 0630    Order Status: Sent Specimen: Wound from Ankle, Right Updated: 02/16/24 0738    Culture, Wound Anaerobic [[id number]] Collected: 02/16/24 0630    Order Status: Canceled Specimen: Wound Updated: 02/16/24 0720                  Final Active Diagnoses:    Diagnosis Date Noted POA    PRINCIPAL PROBLEM:  Spider bite [T63.301A] 02/16/2024 Yes    Essential hypertension [I10] 02/17/2024 Yes    Vitamin D deficiency [E55.9] 02/17/2024 Yes    Chronic combined systolic and diastolic heart failure [I50.42] 02/17/2024 Yes    Wound of right lower extremity [S81.801A] 02/16/2024 Yes    Neuropathy [G62.9] 02/16/2024 Yes    End-stage renal disease on hemodialysis [N18.6, Z99.2] 08/19/2021 Not Applicable    Type 2 diabetes mellitus, without long-term current use of insulin [E11.9] 08/19/2021 Yes    Coronary artery disease involving native heart [I25.10] 08/19/2021 Yes    Pulmonary HTN [I27.20] 08/19/2021 Yes      Problems Resolved During this Admission:    Diagnosis Date Noted Date Resolved POA    Assistance needed with transportation [Z74.8] 02/16/2024 02/16/2024 Not Applicable    History of medication noncompliance [Z91.148] 02/16/2024 02/17/2024 Not Applicable    Shortness of breath  [R06.02] 02/16/2024 02/17/2024 Yes    Hypertensive urgency [I16.0] 08/19/2021 02/17/2024 Yes       Discharged Condition: fair    Disposition: Home or Self Care    Follow Up:   Follow-up Information       Center, [facility]. Schedule an appointment as soon as possible for a visit.    Contact information:  [address] [facility] [place] [address]  [phone]               Dialysis unit Follow up.    Why: Continue as previous Monday Wednesday and Friday             Virgie Fajardo MD. Schedule an appointment as soon as possible for a visit in 3 week(s).    Specialty: Cardiology  Contact information:  [address] [facility] [place] [address]  [phone]                           Patient Instructions:      Diet renal     Change dressing (specify)   Order Comments: Dressing change: daily and clean with soap and water.     Activity as tolerated       Significant Diagnostic Studies: Labs: BMP:   Recent Labs   Lab 02/16/24  0610 02/17/24  0440   GLU 97 107*   * 132*   K 5.9* 5.0   CL 90* 99   CO2 22 28   BUN 76* 35*   CREATININE 9.89* 5.94*   CALCIUM 9.5 8.9   MG  --  2.4*   , CMP   Recent Labs   Lab 02/16/24  0610 02/17/24  0440   * 132*   K 5.9* 5.0   CL 90* 99   CO2 22 28   GLU 97 107*   BUN 76* 35*   CREATININE 9.89* 5.94*   CALCIUM 9.5 8.9   PROT 6.8 6.0*   ALBUMIN 3.0* 2.7*   BILITOT 1.4* 0.9   ALKPHOS 234* 202*   AST 49* 20   ALT 20 17   ANIONGAP 21* 10   , and CBC   Recent Labs   Lab 02/16/24  0610 02/17/24  0440   WBC 5.53 4.16*   HGB 8.2* 7.7*   HCT 24.7* 24.3*   * 121*     Imaging Results              X-Ray Chest AP Portable (Final result)  Result time 02/16/24 08:00:01      Final result by Alexandro Haji DO (02/16/24 08:00:01)                   Impression:      Patchy airspace opacities scattered throughout the lungs, edema versus infection.    Bulky calcifications overlying the bilateral humerus.      Electronically signed by: Alexandro Haji  Date:    02/16/2024  Time:    08:00                Narrative:    EXAMINATION:  XR CHEST AP PORTABLE    CLINICAL HISTORY:  CHF;    TECHNIQUE:  XR CHEST AP PORTABLE    COMPARISON:  2021    FINDINGS:  No lines or tubes.    Patchy airspace opacities scattered throughout the lungs, edema versus infection.    Normal pleura.    Cardiac silhouette is similar to comparison exam.    No obvious acute bone findings.    Bulky calcifications overlying the bilateral humerus.                                    Intake/Output - Last 3 Shifts         02/15 0700  02/16 0659 02/16 0700  02/17 0659 02/17 0700 02/18 0659    P.O.  600     Total Intake(mL/kg)  600 (9.4)     Urine (mL/kg/hr)  0 (0)     Other  3200     Total Output  3200     Net  -2600                        Pending Diagnostic Studies:       Procedure Component Value Units Date/Time    Hemoglobin A1C [[id number]] Collected: 02/17/24 0440    Order Status: Sent Lab Status: In process Updated: 02/17/24 0447    Specimen: Blood     Hemoglobin A1C ([place]) [[id number]] Collected: 02/17/24 0440    Order Status: Sent Lab Status: In process Updated: 02/17/24 0740    Specimen: Blood            Medications:  Reconciled Home Medications:      Medication List        START taking these medications      carvediloL 6.25 MG tablet  Commonly known as: COREG  Take 1 tablet (6.25 mg total) by mouth 2 (two) times daily with meals.     vitamin D 1000 units Tab  Commonly known as: VITAMIN D3  Take 1 tablet (1,000 Units total) by mouth once daily.            CHANGE how you take these medications      * hydrALAZINE 50 MG tablet  Commonly known as: APRESOLINE  Take 2 tablets by mouth every 8 (eight) hours.  What changed: Another medication with the same name was changed. Make sure you understand how and when to take each.     * hydrALAZINE 100 MG tablet  Commonly known as: APRESOLINE  Take 1 tablet (100 mg total) by mouth every 8 (eight) hours.  What changed:   medication strength  how much to take     * HYDROcodone-acetaminophen 7.5-325 mg per  tablet  Commonly known as: NORCO  Take 1 tablet by mouth every 12 (twelve) hours as needed.  What changed: Another medication with the same name was added. Make sure you understand how and when to take each.     * HYDROcodone-acetaminophen  mg per tablet  Commonly known as: NORCO  Take 1 tablet by mouth every 6 (six) hours as needed for Pain.  What changed: You were already taking a medication with the same name, and this prescription was added. Make sure you understand how and when to take each.           * This list has 4 medication(s) that are the same as other medications prescribed for you. Read the directions carefully, and ask your doctor or other care provider to review them with you.                CONTINUE taking these medications      amLODIPine 10 MG tablet  Commonly known as: NORVASC  Take 10 mg by mouth once daily.     aspirin 81 MG EC tablet  Commonly known as: ECOTRIN  Take 1 tablet (81 mg total) by mouth once daily.     atorvastatin 40 MG tablet  Commonly known as: LIPITOR  Take 1 tablet (40 mg total) by mouth every evening.     cloNIDine 0.2 MG tablet  Commonly known as: CATAPRES  Take 0.2 mg by mouth 2 (two) times daily.     gabapentin 300 MG capsule  Commonly known as: NEURONTIN  Take 300 mg by mouth 2 (two) times daily.     olmesartan 40 MG tablet  Commonly known as: BENICAR  Take 40 mg by mouth once daily.            STOP taking these medications      clindamycin 150 MG capsule  Commonly known as: CLEOCIN              Indwelling Lines/Drains at time of discharge:   Lines/Drains/Airways       Drain  Duration                  Hemodialysis AV Fistula 02/16/24 0545 Left upper arm 1 day                    Time spent on the discharge of patient: 35 minutes         Homero Emerson MD  Department of Hospital Medicine  [facility]

## 2024-02-17 NOTE — ASSESSMENT & PLAN NOTE
Patient is known to Dr. Corral - [facility]     - ECHO pending (presence of cardiac murmur)  - Continue home medications ASA and Statin     02/17  echo Summary         Left Ventricle: The left ventricle is normal in size. Increased wall thickness. There is severe concentric hypertrophy. There is mildly reduced systolic function. Biplane (2D) method of discs ejection fraction is 45%. Global longitudinal strain is reduced. Grade III diastolic dysfunction.    Right Ventricle: Right ventricular enlargement. Systolic function is normal.    Left Atrium: Left atrium is moderately dilated.    Right Atrium: Right atrium is mildly dilated.    Aortic Valve: The aortic valve is a trileaflet valve. Mildly calcified cusps. 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 There is no significant regurgitation. If there is clinical suspicion for endocarditis, please check blood cultures x2.    Mitral Valve: There is bileaflet sclerosis. There is mitral annular calcification present.    Tricuspid Valve: There is mild regurgitation.    Pulmonary Artery: The estimated pulmonary artery systolic pressure is 49 mmHg.    IVC/SVC: Normal venous pressure at 3 mmHg.    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 There is no significant regurgitation. Less likely to be vegetation however, if there is clinical suspicion or risk factors for endocarditis, please check blood cultures x2.      Have patient follow up with Cardiology  No reason to think endocarditis, patient has had no fever and lesion on foot is new

## 2024-02-17 NOTE — NURSING
Pt returned from dialysis. RN gave me report. Pt has been htn the whole time. All PM meds given to help control htn. Will reassess within an hour and admin prn order if needed.

## 2024-02-17 NOTE — PHARMACY MED REC
"Admission Medication History     The home medication history was taken by Julia Garza.    You may go to "Admission" then "Reconcile Home Medications" tabs to review and/or act upon these items.     The home medication list has been updated by the Pharmacy department.   Please read ALL comments highlighted in yellow.   Please address this information as you see fit.    Feel free to contact us if you have any questions or require assistance.    The following medication was added:  Hydrocodone-acetaminophen 7.5-325 mg Q12H PRN    The following dose changes were added:  Gabapentin 100 mg QHS was updated to 300 mg BID  Hydralazine 25 mg was updated to 50 mg      The medications listed below were removed from the home medication list. Please reorder if appropriate:  Aspirin 81 mg  Atorvastatin 40 mg  Losartan 50 mg  Metoprolol 25 mg    Medications listed below were obtained from: Analytic software- [employer], Medical records, and Patient's pharmacy  (Not in a hospital admission)        Current Outpatient Medications on File Prior to Encounter   Medication Sig Dispense Refill Last Dose    gabapentin (NEURONTIN) 300 MG capsule Take 300 mg by mouth 2 (two) times daily.       hydrALAZINE (APRESOLINE) 50 MG tablet Take 2 tablets by mouth every 8 (eight) hours.       olmesartan (BENICAR) 40 MG tablet Take 40 mg by mouth.       amLODIPine (NORVASC) 10 MG tablet Take 10 mg by mouth once daily.       clindamycin (CLEOCIN) 150 MG capsule Take 2 capsules (300 mg total) by mouth 4 (four) times daily. for 7 days 56 capsule 0     cloNIDine (CATAPRES) 0.2 MG tablet Take 0.2 mg by mouth 2 (two) times daily.       HYDROcodone-acetaminophen (NORCO) 7.5-325 mg per tablet Take 1 tablet by mouth 2 (two) times a day.       [DISCONTINUED] aspirin (ECOTRIN) 81 MG EC tablet Take 1 tablet (81 mg total) by mouth once daily.  0     [DISCONTINUED] atorvastatin (LIPITOR) 40 MG tablet Take 1 tablet (40 mg total) by mouth every evening. 90 tablet 3     " [DISCONTINUED] gabapentin (NEURONTIN) 100 MG capsule Take 1 capsule (100 mg total) by mouth nightly.       [DISCONTINUED] hydrALAZINE (APRESOLINE) 25 MG tablet Take 2 tablets (50 mg total) by mouth every 8 (eight) hours.       [DISCONTINUED] ibuprofen (ADVIL,MOTRIN) 600 MG tablet Take 1 tablet (600 mg total) by mouth every 8 (eight) hours.       [DISCONTINUED] losartan (COZAAR) 50 MG tablet Take 50 mg by mouth once daily.       [DISCONTINUED] metoprolol tartrate (LOPRESSOR) 25 MG tablet Take 1 tablet (25 mg total) by mouth 2 (two) times daily. 60 tablet 11        Potential issues to be addressed PRIOR TO DISCHARGE  N/A    Julia Garza  EXT [id number]                 .

## 2024-02-17 NOTE — PLAN OF CARE
Problem: Physical Therapy  Goal: Physical Therapy Goal  Description: PT eval completed. Pt demo some difficulty with ambulation d/t pain from spider bite. Pt declines further PT interventions at this time.   Outcome: Met

## 2024-02-17 NOTE — ASSESSMENT & PLAN NOTE
Chronic, uncontrolled. Latest blood pressure and vitals reviewed-     Temp:  [98.2 °F (36.8 °C)-99.1 °F (37.3 °C)]   Pulse:  []   Resp:  [7-18]   BP: (177-224)/()   SpO2:  [94 %-100 %] .   Home meds for hypertension were reviewed and noted below.   Hypertension Medications               amLODIPine (NORVASC) 10 MG tablet Take 10 mg by mouth once daily.    cloNIDine (CATAPRES) 0.2 MG tablet Take 0.2 mg by mouth 2 (two) times daily.    hydrALAZINE (APRESOLINE) 25 MG tablet Take 2 tablets (50 mg total) by mouth every 8 (eight) hours.              While in the hospital, will manage blood pressure as follows; Continue home antihypertensive regimen    Will utilize p.r.n. blood pressure medication only if patient's blood pressure greater than 180/110 and she develops symptoms such as worsening chest pain or shortness of breath.    2/17  BP remains elevated /78 & 216/75. Pt given PRN labetalol and started losartan (alternative for olmesartan)  Consulting nephrology about HTN

## 2024-02-17 NOTE — PLAN OF CARE
Problem: Device-Related Complication Risk (Hemodialysis)  Goal: Safe, Effective Therapy Delivery  Outcome: Ongoing, Progressing     Problem: Hemodynamic Instability (Hemodialysis)  Goal: Effective Tissue Perfusion  Outcome: Ongoing, Progressing     Problem: Infection (Hemodialysis)  Goal: Absence of Infection Signs and Symptoms  Outcome: Ongoing, Progressing     Problem: Adult Inpatient Plan of Care  Goal: Plan of Care Review  Outcome: Ongoing, Progressing  Goal: Patient-Specific Goal (Individualized)  Outcome: Ongoing, Progressing  Goal: Absence of Hospital-Acquired Illness or Injury  Outcome: Ongoing, Progressing  Goal: Optimal Comfort and Wellbeing  Outcome: Ongoing, Progressing  Goal: Readiness for Transition of Care  Outcome: Ongoing, Progressing     Problem: Diabetes Comorbidity  Goal: Blood Glucose Level Within Targeted Range  Outcome: Ongoing, Progressing     Problem: Impaired Wound Healing  Goal: Optimal Wound Healing  Outcome: Ongoing, Progressing

## 2024-02-17 NOTE — PT/OT/SLP EVAL
Occupational Therapy   Evaluation and Discharge Note    Name: Alissa Dominguez  MRN: [id number]  Admitting Diagnosis: Spider bite  Recent Surgery: * No surgery found *      Recommendations:     Discharge Recommendations: No Therapy Indicated  Discharge Equipment Recommendations: none  Barriers to discharge:  None    Assessment:     Alissa Dominguez is a 44 y.o. female with a medical diagnosis of Spider bite. At this time, patient is functioning at their prior level of function and does not require further acute OT services.     Plan:     During this hospitalization, patient does not require further acute OT services.  Please re-consult if situation changes.    Plan of Care Reviewed with: patient    Subjective     Chief Complaint: (R) Foot pain  Patient/Family Comments/goals: To return home    Occupational Profile:  Living Environment: Pt lives in single level home with  and daughter  Previous level of function: I with self care prior  Roles and Routines: I with daily activities  Equipment Used at home: cane, straight, oxygen, rollator  Assistance upon Discharge: Family    Pain/Comfort:  Pain Rating 1: 5/10  Location - Side 1: Right  Location 1: foot  Pain Addressed 1: Reposition, Distraction, Nurse notified  Pain Rating Post-Intervention 1: 10/10    Patients cultural, spiritual, Anabaptist conflicts given the current situation: no    Objective:     Communicated with: MANDEEP Fuentes prior to session.  Patient found HOB elevated with blood pressure cuff, pulse ox (continuous), telemetry, peripheral IV upon OT entry to room.    General Precautions: Standard, fall  Orthopedic Precautions: N/A  Braces: N/A  Respiratory Status: Room air     Occupational Performance:    Bed Mobility:    Patient completed Supine to Sit with modified independence  Patient completed Sit to Supine with modified independence    Functional Mobility/Transfers:  Patient completed Sit <> Stand Transfer with contact guard assistance   with  gait belt and manual assistance   Functional Mobility: CGA with manual assistance/HH assistance    Activities of Daily Living:  Lower Body Dressing: independence donning pants and shoes    Cognitive/Visual Perceptual:  Cognitive/Psychosocial Skills:     -       Oriented to: Person, Place, and Situation   -       Follows Commands/attention:Follows one-step commands  -       Communication: clear/fluent  Visual/Perceptual:      -wears glasses. Hearing wfl      Physical Exam:  Balance:    -       I with EOB sitting, CGA with mobility  Skin integrity: Visible skin intact  Edema:  None noted  Sensation:    -       Intact  Motor Planning:    -       wfl  Dominant hand:    -       left  Upper Extremity Range of Motion:     -       Right Upper Extremity: WFL  -       Left Upper Extremity: WFL  Upper Extremity Strength:    -       Right Upper Extremity: WFL  -       Left Upper Extremity: WFL   Strength:    -       Right Upper Extremity: WFL  -       Left Upper Extremity: WFL    AMPAC 6 Click ADL:  AMPAC Total Score:      Treatment & Education:  OT evaluation completed. See eval for details. Pt is an eval only. Pt presents with no significant decline with ability to perform self care activities. Pt demonstrated (I) with LE dressing  and reports no decline with bathing. Pt reports being able to take care of herself and stated she  does not need therapy. Pt required CGA for mobility due to (R) foot pain, but declined the use of a walker. No skilled OT indicated.    Patient left HOB elevated with all lines intact, call button in reach, and nurse notified    GOALS:   Multidisciplinary Problems       Occupational Therapy Goals       Not on file              Multidisciplinary Problems (Resolved)          Problem: Occupational Therapy    Goal Priority Disciplines Outcome Interventions   Occupational Therapy Goal   (Resolved)     OT, PT/OT Met    Description: OT evaluation completed. See eval for details. Pt is an eval only.  Pt presents with no significant decline with ability to perform self care activities. Pt demonstrated (I) with LE dressing  and reports no decline with bathing. Pt reports being able to take care of herself and stated she  does not need therapy. No skilled OT indicated.                       History:     Past Medical History:   Diagnosis Date    Diabetes mellitus     Dialysis patient     Hypertension     Nerve pain     Renal disorder          Past Surgical History:   Procedure Laterality Date    AV FISTULA PLACEMENT Left        Time Tracking:     OT Date of Treatment: 02/17/24  OT Start Time: 1222  OT Stop Time: 1232  OT Total Time (min): 10 min    Billable Minutes:Evaluation 10    2/17/2024

## 2024-02-17 NOTE — ASSESSMENT & PLAN NOTE
I+D performed in the ED   Vancomycin pharm to dose   Surgery consulted - no plan for surgical intervention     02/17 wound culture growing staph aureus with sensitivity pending    Microbiology Results (last 7 days)       Procedure Component Value Units Date/Time    Culture, Wound [[id number]]  (Abnormal) Collected: 02/16/24 0630    Order Status: Completed Specimen: Wound from Ankle, Right Updated: 02/17/24 1014     Culture, Wound/Abscess Heavy Growth Staphylococcus aureus     Comment: Susceptibility To Follow       Culture, Anaerobe [[id number]] Collected: 02/16/24 0630    Order Status: Sent Specimen: Wound from Ankle, Right Updated: 02/16/24 0738    Culture, Wound Anaerobic [[id number]] Collected: 02/16/24 0630    Order Status: Canceled Specimen: Wound Updated: 02/16/24 0720

## 2024-02-17 NOTE — ASSESSMENT & PLAN NOTE
Patient is currently fluid overloaded with +2 pitting edema of bilateral lower extremities and edema on CXR. Dialysis order for today.     Creatine stable for now. BMP reviewed- noted Estimated Creatinine Clearance: 10.2 mL/min (A) (based on SCr of 5.94 mg/dL (H)). according to latest data. Based on current GFR, CKD stage is end stage.  Monitor UOP and serial BMP and adjust therapy as needed. Renally dose meds. Avoid nephrotoxic medications and procedures.    - Dialysis M/W/F  - Vitamin D 8.0  - Mg/Phos 2.4/7.4  - Nephrology consulted for dialysis - Dr. Matias

## 2024-02-17 NOTE — ASSESSMENT & PLAN NOTE
Chronic, uncontrolled. Latest blood pressure and vitals reviewed-     Temp:  [98.6 °F (37 °C)-99.1 °F (37.3 °C)]   Pulse:  []   Resp:  [7-18]   BP: (174-224)/(64-98)   SpO2:  [85 %-100 %] .   Home meds for hypertension were reviewed and noted below.   Hypertension Medications               amLODIPine (NORVASC) 10 MG tablet Take 10 mg by mouth once daily.    cloNIDine (CATAPRES) 0.2 MG tablet Take 0.2 mg by mouth 2 (two) times daily.    hydrALAZINE (APRESOLINE) 50 MG tablet Take 2 tablets by mouth every 8 (eight) hours.    olmesartan (BENICAR) 40 MG tablet Take 40 mg by mouth once daily.            While in the hospital, will manage blood pressure as follows; Continue home antihypertensive regimen    Will utilize p.r.n. blood pressure medication only if patient's blood pressure greater than 180/110 and she develops symptoms such as worsening chest pain or shortness of breath.    02/17 Dr. Matias made adjustment in blood pressure medications  Continue to follow output

## 2024-02-17 NOTE — DISCHARGE INSTRUCTIONS
Wash wound daily with soap and water and place clean dressing  Have wound checked mid week at [facility]

## 2024-02-17 NOTE — PT/OT/SLP EVAL
Physical Therapy Evaluation and Discharge Note    Patient Name:  Alissa Dominguez   MRN:  [id number]    Recommendations:     Discharge Recommendations: Low Intensity Therapy  Discharge Equipment Recommendations: none   Barriers to discharge: None    Assessment:     Alissa Dominguez is a 44 y.o. female admitted with a medical diagnosis of Spider bite. Pt is able to complete bed mobility with Mod I and transfer with CGA, gait CGA HHA.  At this time, patient is mostly limited by pain in R foot from spider bite and does not wish for further PT interventions.     Recent Surgery: * No surgery found *      Plan:     During this hospitalization, patient does not require further acute PT services.  Please re-consult if situation changes.      Subjective     Chief Complaint: spider bite  Patient/Family Comments/goals: agreeable to PT eval  Pain/Comfort:  Pain Rating 1: 5/10  Location - Side 1: Right  Location 1: foot  Pain Addressed 1: Reposition, Distraction, Nurse notified  Pain Rating Post-Intervention 1: 10/10    Patients cultural, spiritual, Advent conflicts given the current situation: no    Living Environment:  Pt lives home with  in 1 story home with 0 steps to enter.  Prior to admission, patients level of function was indep with intermittent use of SPC.  Equipment used at home: cane, straight, oxygen, rollator.  DME owned (not currently used): none.  Upon discharge, patient will have assistance from family and self.    Objective:     Communicated with RN prior to session.  Patient found HOB elevated with blood pressure cuff, pulse ox (continuous), oxygen, telemetry upon PT entry to room.    General Precautions: Standard, fall    Orthopedic Precautions:N/A   Braces: N/A  Respiratory Status: Nasal cannula, flow 2 L/min    Exams:  Cognitive Exam:  Patient is oriented to Person, Place, Time, and Situation  Skin Integrity/Edema:      -       Skin integrity: Wound R medial ankle  RLE ROM: WFL except  ankle  RLE Strength: Deficits: 3+/5  LLE ROM: WNL  LLE Strength: WNL    Functional Mobility:  Bed Mobility:     Supine to Sit: modified independence  Sit to Supine: modified independence  Transfers:     Sit to Stand:  contact guard assistance with hand-held assist  Gait: 40ft with bilat HHA, step-to gait pattern d/t pain  Balance: Fair in stance     AM-PAC 6 CLICK MOBILITY  Total Score:20       Treatment and Education:  PT eval completed. Pt declines the need for further therapeutic interventions.     AM-PAC 6 CLICK MOBILITY  Total Score:20     Patient left HOB elevated with all lines intact, call button in reach, and RN notified.    GOALS:   Multidisciplinary Problems       Physical Therapy Goals       Not on file              Multidisciplinary Problems (Resolved)          Problem: Physical Therapy    Goal Priority Disciplines Outcome Goal Variances Interventions   Physical Therapy Goal   (Resolved)     PT, PT/OT Met     Description: PT eval completed. Pt demo some difficulty with ambulation d/t pain from spider bite. Pt declines further PT interventions at this time.                        History:     Past Medical History:   Diagnosis Date    Diabetes mellitus     Dialysis patient     Hypertension     Nerve pain     Renal disorder        Past Surgical History:   Procedure Laterality Date    AV FISTULA PLACEMENT Left        Time Tracking:     PT Received On: 02/17/24  PT Start Time: 1222     PT Stop Time: 1232  PT Total Time (min): 10 min     Billable Minutes: Evaluation low complexity      02/17/2024

## 2024-02-17 NOTE — PLAN OF CARE
Problem: Device-Related Complication Risk (Hemodialysis)  Goal: Safe, Effective Therapy Delivery  2/17/2024 1740 by Rasheeda Alvarado RN  Outcome: Adequate for Care Transition  2/17/2024 1639 by Rasheeda Alvarado RN  Outcome: Ongoing, Progressing     Problem: Hemodynamic Instability (Hemodialysis)  Goal: Effective Tissue Perfusion  2/17/2024 1740 by Rasheeda Alvarado RN  Outcome: Adequate for Care Transition  2/17/2024 1639 by Rasheeda Alvarado RN  Outcome: Ongoing, Progressing     Problem: Infection (Hemodialysis)  Goal: Absence of Infection Signs and Symptoms  2/17/2024 1740 by Rasheeda Alvarado RN  Outcome: Adequate for Care Transition  2/17/2024 1639 by Rasheeda Alvarado RN  Outcome: Ongoing, Progressing     Problem: Adult Inpatient Plan of Care  Goal: Plan of Care Review  2/17/2024 1740 by Rasheeda Alvarado RN  Outcome: Adequate for Care Transition  2/17/2024 1639 by Rasheeda Alvarado RN  Outcome: Ongoing, Progressing  Goal: Patient-Specific Goal (Individualized)  2/17/2024 1740 by Rasheeda Alvarado RN  Outcome: Adequate for Care Transition  2/17/2024 1639 by Rasheeda Alvarado RN  Outcome: Ongoing, Progressing  Goal: Absence of Hospital-Acquired Illness or Injury  2/17/2024 1740 by Rasheeda Alvarado RN  Outcome: Adequate for Care Transition  2/17/2024 1639 by Rasheeda Alvarado RN  Outcome: Ongoing, Progressing  Goal: Optimal Comfort and Wellbeing  2/17/2024 1740 by Rasheeda Alvarado RN  Outcome: Adequate for Care Transition  2/17/2024 1639 by Rasheeda Alvarado RN  Outcome: Ongoing, Progressing  Goal: Readiness for Transition of Care  2/17/2024 1740 by Rasheeda Alvarado RN  Outcome: Adequate for Care Transition  2/17/2024 1639 by Rasheeda Alvarado RN  Outcome: Ongoing, Progressing     Problem: Diabetes Comorbidity  Goal: Blood Glucose Level Within Targeted Range  2/17/2024 1740 by Rasheeda Alvarado RN  Outcome: Adequate for Care Transition  2/17/2024 1639 by Rasheeda Alvarado RN  Outcome: Ongoing, Progressing     Problem:  Impaired Wound Healing  Goal: Optimal Wound Healing  2/17/2024 1740 by Rasheeda Alvarado, RN  Outcome: Adequate for Care Transition  2/17/2024 1639 by Rasheeda Alvarado, RN  Outcome: Ongoing, Progressing

## 2024-02-17 NOTE — ASSESSMENT & PLAN NOTE
I+D performed in the ED   Vancomycin pharm to dose   Surgery consulted - no plan for surgical intervention

## 2024-02-18 LAB — MICROORGANISM SPEC CULT: ABNORMAL

## 2024-02-18 NOTE — PROGRESS NOTES
[facility]  Nephrology  Progress Note    Patient Name: Alissa Dominguez  MRN: [id number]  Admission Date: 2/16/2024  Hospital Length of Stay: 0 days  Attending Provider: Silke att. providers found   Primary Care Physician: Silke, Primary Doctor  Principal Problem:Spider bite    Subjective:     HPI: 44-year-old woman with several chronic medical problems including ESRD.  She was sent to the ER for evaluation of a wound on her right foot.  Blood pressure is not controlled.  She missed her regular dialysis on Wednesday.  She has mild SOB.    Interval History:  Afebrile.  She denies SOB.    Review of patient's allergies indicates:   Allergen Reactions    Pseudoephedrine Shortness Of Breath and Other (See Comments)    Ampicillin     Penicillins     Sudafed cold-allergy      Current Facility-Administered Medications   Medication Frequency    0.9%  NaCl infusion PRN    acetaminophen tablet 650 mg Q8H PRN    amLODIPine tablet 10 mg Daily    aspirin EC tablet 81 mg Daily    atorvastatin tablet 40 mg QHS    cloNIDine tablet 0.2 mg TID    dextrose 10% bolus 125 mL 125 mL PRN    dextrose 10% bolus 250 mL 250 mL PRN    gabapentin capsule 100 mg Nightly    glucagon (human recombinant) injection 1 mg PRN    glucose chewable tablet 16 g PRN    glucose chewable tablet 24 g PRN    heparin (porcine) injection 5,000 Units Q8H    hydrALAZINE tablet 100 mg Q8H    HYDROcodone-acetaminophen  mg per tablet 1 tablet Q6H PRN    labetaloL injection 20 mg Q4H PRN    losartan tablet 100 mg Daily    naloxone 0.4 mg/mL injection 0.02 mg PRN    ondansetron injection 4 mg Q6H PRN    sodium chloride 0.9% flush 10 mL Q12H PRN    vancomycin - pharmacy to dose pharmacy to manage frequency     Current Outpatient Medications   Medication    gabapentin (NEURONTIN) 300 MG capsule    hydrALAZINE (APRESOLINE) 50 MG tablet    olmesartan (BENICAR) 40 MG tablet    amLODIPine (NORVASC) 10 MG tablet    aspirin (ECOTRIN) 81 MG EC tablet     atorvastatin (LIPITOR) 40 MG tablet    carvediloL (COREG) 6.25 MG tablet    cloNIDine (CATAPRES) 0.2 MG tablet    hydrALAZINE (APRESOLINE) 100 MG tablet    HYDROcodone-acetaminophen (NORCO)  mg per tablet    HYDROcodone-acetaminophen (NORCO) 7.5-325 mg per tablet    vitamin D (VITAMIN D3) 1000 units Tab       Objective:     Vital Signs (Most Recent):  Temp: 98.6 °F (37 °C) (02/17/24 1151)  Pulse: 65 (02/17/24 1430)  Resp: 12 (02/17/24 1510)  BP: (!) 174/68 (02/17/24 1414)  SpO2: 97 % (02/17/24 1430) Vital Signs (24h Range):  Temp:  [98.6 °F (37 °C)-99.1 °F (37.3 °C)] 98.6 °F (37 °C)  Pulse:  [] 65  Resp:  [7-18] 12  SpO2:  [85 %-100 %] 97 %  BP: (174-216)/(64-82) 174/68     Weight: 63 kg (139 lb) (02/17/24 0845)  Body mass index is 27.15 kg/m².  Body surface area is 1.63 meters squared.    I/O last 3 completed shifts:  In: 600 [P.O.:600]  Out: 3200 [Other:3200]     Physical Exam  Eyes:      Pupils: Pupils are equal, round, and reactive to light.   Cardiovascular:      Rate and Rhythm: Normal rate and regular rhythm.   Pulmonary:      Breath sounds: No wheezing or rales.   Abdominal:      Palpations: Abdomen is soft.      Tenderness: There is no abdominal tenderness.   Musculoskeletal:      Cervical back: Neck supple.      Right lower leg: Edema present.      Left lower leg: Edema present.      Comments: Right foot wound has been debrided and is dressed   Neurological:      Mental Status: She is alert.          Significant Labs:  BMP:   Recent Labs   Lab 02/17/24  0440   *   *   K 5.0   CL 99   CO2 28   BUN 35*   CREATININE 5.94*   CALCIUM 8.9   MG 2.4*     CBC:   Recent Labs   Lab 02/17/24  0440   WBC 4.16*   RBC 2.35*   HGB 7.7*   HCT 24.3*   *   .4*   MCH 32.8*   MCHC 31.7*        Significant Imaging:    Assessment/Plan:     Renal/  ESRD (end stage renal disease)  Dialyzed yesterday    Endocrine  Type 2 diabetes mellitus, without long-term current use of insulin  Underlying  condition    Other  * Spider bite  Wound debrided in ER.  Continue vancomycin        Thank you for your consult.     Carl Matias MD  Nephrology  [facility]

## 2024-02-18 NOTE — SUBJECTIVE & OBJECTIVE
Interval History:  Afebrile.  She denies SOB.    Review of patient's allergies indicates:   Allergen Reactions    Pseudoephedrine Shortness Of Breath and Other (See Comments)    Ampicillin     Penicillins     Sudafed cold-allergy      Current Facility-Administered Medications   Medication Frequency    0.9%  NaCl infusion PRN    acetaminophen tablet 650 mg Q8H PRN    amLODIPine tablet 10 mg Daily    aspirin EC tablet 81 mg Daily    atorvastatin tablet 40 mg QHS    cloNIDine tablet 0.2 mg TID    dextrose 10% bolus 125 mL 125 mL PRN    dextrose 10% bolus 250 mL 250 mL PRN    gabapentin capsule 100 mg Nightly    glucagon (human recombinant) injection 1 mg PRN    glucose chewable tablet 16 g PRN    glucose chewable tablet 24 g PRN    heparin (porcine) injection 5,000 Units Q8H    hydrALAZINE tablet 100 mg Q8H    HYDROcodone-acetaminophen  mg per tablet 1 tablet Q6H PRN    labetaloL injection 20 mg Q4H PRN    losartan tablet 100 mg Daily    naloxone 0.4 mg/mL injection 0.02 mg PRN    ondansetron injection 4 mg Q6H PRN    sodium chloride 0.9% flush 10 mL Q12H PRN    vancomycin - pharmacy to dose pharmacy to manage frequency     Current Outpatient Medications   Medication    gabapentin (NEURONTIN) 300 MG capsule    hydrALAZINE (APRESOLINE) 50 MG tablet    olmesartan (BENICAR) 40 MG tablet    amLODIPine (NORVASC) 10 MG tablet    aspirin (ECOTRIN) 81 MG EC tablet    atorvastatin (LIPITOR) 40 MG tablet    carvediloL (COREG) 6.25 MG tablet    cloNIDine (CATAPRES) 0.2 MG tablet    hydrALAZINE (APRESOLINE) 100 MG tablet    HYDROcodone-acetaminophen (NORCO)  mg per tablet    HYDROcodone-acetaminophen (NORCO) 7.5-325 mg per tablet    vitamin D (VITAMIN D3) 1000 units Tab       Objective:     Vital Signs (Most Recent):  Temp: 98.6 °F (37 °C) (02/17/24 1151)  Pulse: 65 (02/17/24 1430)  Resp: 12 (02/17/24 1510)  BP: (!) 174/68 (02/17/24 1414)  SpO2: 97 % (02/17/24 1430) Vital Signs (24h Range):  Temp:  [98.6 °F (37  °C)-99.1 °F (37.3 °C)] 98.6 °F (37 °C)  Pulse:  [] 65  Resp:  [7-18] 12  SpO2:  [85 %-100 %] 97 %  BP: (174-216)/(64-82) 174/68     Weight: 63 kg (139 lb) (02/17/24 0845)  Body mass index is 27.15 kg/m².  Body surface area is 1.63 meters squared.    I/O last 3 completed shifts:  In: 600 [P.O.:600]  Out: 3200 [Other:3200]     Physical Exam  Eyes:      Pupils: Pupils are equal, round, and reactive to light.   Cardiovascular:      Rate and Rhythm: Normal rate and regular rhythm.   Pulmonary:      Breath sounds: No wheezing or rales.   Abdominal:      Palpations: Abdomen is soft.      Tenderness: There is no abdominal tenderness.   Musculoskeletal:      Cervical back: Neck supple.      Right lower leg: Edema present.      Left lower leg: Edema present.      Comments: Right foot wound has been debrided and is dressed   Neurological:      Mental Status: She is alert.          Significant Labs:  BMP:   Recent Labs   Lab 02/17/24  0440   *   *   K 5.0   CL 99   CO2 28   BUN 35*   CREATININE 5.94*   CALCIUM 8.9   MG 2.4*     CBC:   Recent Labs   Lab 02/17/24  0440   WBC 4.16*   RBC 2.35*   HGB 7.7*   HCT 24.3*   *   .4*   MCH 32.8*   MCHC 31.7*        Significant Imaging:

## 2024-02-19 ENCOUNTER — PATIENT OUTREACH (OUTPATIENT)
Dept: ADMINISTRATIVE | Facility: CLINIC | Age: 44
End: 2024-02-19

## 2024-02-19 NOTE — PROGRESS NOTES
C3 nurse attempted to contact Alissa Dominguez  for a TCC post hospital discharge follow up call. No answer. The patient does not have a scheduled HOSFU appointment, and the pt [facility].

## 2024-02-20 LAB
BACTERIA SPEC ANAEROBE CULT: NORMAL
HBA1C MFR BLD: 4.4 % (ref 4–5.6)
OHS QRS DURATION: 92 MS
OHS QTC CALCULATION: 413 MS

## 2024-09-27 ENCOUNTER — OUTSIDE PLACE OF SERVICE (OUTPATIENT)
Dept: ADMINISTRATIVE | Facility: HOSPITAL | Age: 44
End: 2024-09-27
Payer: MEDICARE

## 2025-03-06 ENCOUNTER — HOSPITAL ENCOUNTER (EMERGENCY)
Facility: HOSPITAL | Age: 45
Discharge: HOME | End: 2025-03-06
Attending: FAMILY MEDICINE
Payer: MEDICARE

## 2025-03-06 VITALS
BODY MASS INDEX: 21.6 KG/M2 | RESPIRATION RATE: 18 BRPM | TEMPERATURE: 98 F | WEIGHT: 110 LBS | HEART RATE: 58 BPM | DIASTOLIC BLOOD PRESSURE: 73 MMHG | SYSTOLIC BLOOD PRESSURE: 171 MMHG | HEIGHT: 60 IN | OXYGEN SATURATION: 94 %

## 2025-03-06 DIAGNOSIS — R07.9 CHEST PAIN: ICD-10-CM

## 2025-03-06 DIAGNOSIS — I16.0 HYPERTENSIVE URGENCY: Primary | ICD-10-CM

## 2025-03-06 LAB
ALBUMIN SERPL BCP-MCNC: 2.9 G/DL (ref 3.5–5)
ALBUMIN/GLOB SERPL: 0.9 {RATIO}
ALP SERPL-CCNC: 148 U/L (ref 40–150)
ALT SERPL W P-5'-P-CCNC: <7 U/L
ANION GAP SERPL CALCULATED.3IONS-SCNC: 24 MMOL/L (ref 7–16)
AST SERPL W P-5'-P-CCNC: 24 U/L (ref 5–34)
BASOPHILS # BLD AUTO: 0.04 K/UL (ref 0–0.2)
BASOPHILS NFR BLD AUTO: 1.1 % (ref 0–1)
BILIRUB SERPL-MCNC: 0.4 MG/DL
BUN SERPL-MCNC: 49 MG/DL (ref 7–19)
BUN/CREAT SERPL: 8 (ref 6–20)
CALCIUM SERPL-MCNC: 9.7 MG/DL (ref 8.4–10.2)
CHLORIDE SERPL-SCNC: 94 MMOL/L (ref 98–107)
CO2 SERPL-SCNC: 23 MMOL/L (ref 22–29)
CREAT SERPL-MCNC: 6.46 MG/DL (ref 0.55–1.02)
DIFFERENTIAL METHOD BLD: ABNORMAL
EGFR (NO RACE VARIABLE) (RUSH/TITUS): 8 ML/MIN/1.73M2
EOSINOPHIL # BLD AUTO: 0.26 K/UL (ref 0–0.5)
EOSINOPHIL NFR BLD AUTO: 6.9 % (ref 1–4)
ERYTHROCYTE [DISTWIDTH] IN BLOOD BY AUTOMATED COUNT: 16.3 % (ref 11.5–14.5)
GLOBULIN SER-MCNC: 3.4 G/DL (ref 2–4)
GLUCOSE SERPL-MCNC: 79 MG/DL (ref 74–100)
HCT VFR BLD AUTO: 37.4 % (ref 38–47)
HGB BLD-MCNC: 11.8 G/DL (ref 12–16)
IMM GRANULOCYTES # BLD AUTO: 0.03 K/UL (ref 0–0.04)
IMM GRANULOCYTES NFR BLD: 0.8 % (ref 0–0.4)
LYMPHOCYTES # BLD AUTO: 0.84 K/UL (ref 1–4.8)
LYMPHOCYTES NFR BLD AUTO: 22.2 % (ref 27–41)
MCH RBC QN AUTO: 32.2 PG (ref 27–31)
MCHC RBC AUTO-ENTMCNC: 31.6 G/DL (ref 32–36)
MCV RBC AUTO: 102.2 FL (ref 80–96)
MONOCYTES # BLD AUTO: 0.35 K/UL (ref 0–0.8)
MONOCYTES NFR BLD AUTO: 9.3 % (ref 2–6)
MPC BLD CALC-MCNC: 11.2 FL (ref 9.4–12.4)
NEUTROPHILS # BLD AUTO: 2.26 K/UL (ref 1.8–7.7)
NEUTROPHILS NFR BLD AUTO: 59.7 % (ref 53–65)
NRBC # BLD AUTO: 0 X10E3/UL
NRBC, AUTO (.00): 0 %
NT-PROBNP SERPL-MCNC: ABNORMAL PG/ML (ref 1–125)
PLATELET # BLD AUTO: 95 K/UL (ref 150–400)
POTASSIUM SERPL-SCNC: 4.9 MMOL/L (ref 3.5–5.1)
PROT SERPL-MCNC: 6.3 G/DL (ref 6.4–8.3)
RBC # BLD AUTO: 3.66 M/UL (ref 4.2–5.4)
SODIUM SERPL-SCNC: 136 MMOL/L (ref 136–145)
TROPONIN I SERPL HS-MCNC: 48.3 NG/L
TROPONIN I SERPL HS-MCNC: 49.4 NG/L
WBC # BLD AUTO: 3.78 K/UL (ref 4.5–11)

## 2025-03-06 PROCEDURE — 96375 TX/PRO/DX INJ NEW DRUG ADDON: CPT

## 2025-03-06 PROCEDURE — 93005 ELECTROCARDIOGRAM TRACING: CPT

## 2025-03-06 PROCEDURE — 63600175 PHARM REV CODE 636 W HCPCS: Performed by: EMERGENCY MEDICINE

## 2025-03-06 PROCEDURE — 63600175 PHARM REV CODE 636 W HCPCS: Performed by: FAMILY MEDICINE

## 2025-03-06 PROCEDURE — 96374 THER/PROPH/DIAG INJ IV PUSH: CPT

## 2025-03-06 PROCEDURE — 96376 TX/PRO/DX INJ SAME DRUG ADON: CPT

## 2025-03-06 PROCEDURE — 99284 EMERGENCY DEPT VISIT MOD MDM: CPT | Mod: 25

## 2025-03-06 PROCEDURE — 25000003 PHARM REV CODE 250: Performed by: FAMILY MEDICINE

## 2025-03-06 PROCEDURE — 83880 ASSAY OF NATRIURETIC PEPTIDE: CPT | Performed by: FAMILY MEDICINE

## 2025-03-06 PROCEDURE — 80053 COMPREHEN METABOLIC PANEL: CPT | Performed by: FAMILY MEDICINE

## 2025-03-06 PROCEDURE — 25000003 PHARM REV CODE 250: Performed by: EMERGENCY MEDICINE

## 2025-03-06 PROCEDURE — 85025 COMPLETE CBC W/AUTO DIFF WBC: CPT | Performed by: FAMILY MEDICINE

## 2025-03-06 PROCEDURE — 84484 ASSAY OF TROPONIN QUANT: CPT | Performed by: FAMILY MEDICINE

## 2025-03-06 PROCEDURE — 36415 COLL VENOUS BLD VENIPUNCTURE: CPT | Performed by: FAMILY MEDICINE

## 2025-03-06 RX ORDER — HYDRALAZINE HYDROCHLORIDE 20 MG/ML
20 INJECTION INTRAMUSCULAR; INTRAVENOUS
Status: COMPLETED | OUTPATIENT
Start: 2025-03-06 | End: 2025-03-06

## 2025-03-06 RX ORDER — ACETAMINOPHEN 500 MG
1000 TABLET ORAL
Status: COMPLETED | OUTPATIENT
Start: 2025-03-06 | End: 2025-03-06

## 2025-03-06 RX ORDER — ONDANSETRON HYDROCHLORIDE 2 MG/ML
4 INJECTION, SOLUTION INTRAVENOUS
Status: COMPLETED | OUTPATIENT
Start: 2025-03-06 | End: 2025-03-06

## 2025-03-06 RX ORDER — HYDRALAZINE HYDROCHLORIDE 20 MG/ML
10 INJECTION INTRAMUSCULAR; INTRAVENOUS
Status: COMPLETED | OUTPATIENT
Start: 2025-03-06 | End: 2025-03-06

## 2025-03-06 RX ORDER — CLONIDINE HYDROCHLORIDE 0.1 MG/1
0.2 TABLET ORAL
Status: COMPLETED | OUTPATIENT
Start: 2025-03-06 | End: 2025-03-06

## 2025-03-06 RX ORDER — MORPHINE SULFATE 4 MG/ML
4 INJECTION, SOLUTION INTRAMUSCULAR; INTRAVENOUS
Refills: 0 | Status: COMPLETED | OUTPATIENT
Start: 2025-03-06 | End: 2025-03-06

## 2025-03-06 RX ORDER — MINOXIDIL 2.5 MG/1
2.5 TABLET ORAL DAILY
Status: DISCONTINUED | OUTPATIENT
Start: 2025-03-06 | End: 2025-03-07 | Stop reason: HOSPADM

## 2025-03-06 RX ORDER — CLONIDINE HYDROCHLORIDE 0.1 MG/1
0.3 TABLET ORAL
Status: COMPLETED | OUTPATIENT
Start: 2025-03-06 | End: 2025-03-06

## 2025-03-06 RX ORDER — MINOXIDIL 2.5 MG/1
2.5 TABLET ORAL DAILY
Qty: 30 TABLET | Refills: 0 | Status: SHIPPED | OUTPATIENT
Start: 2025-03-06 | End: 2026-03-06

## 2025-03-06 RX ORDER — MINOXIDIL 2.5 MG/1
2.5 TABLET ORAL DAILY
Status: DISCONTINUED | OUTPATIENT
Start: 2025-03-07 | End: 2025-03-06

## 2025-03-06 RX ORDER — HYDROCODONE BITARTRATE AND ACETAMINOPHEN 5; 325 MG/1; MG/1
1 TABLET ORAL
Refills: 0 | Status: COMPLETED | OUTPATIENT
Start: 2025-03-06 | End: 2025-03-06

## 2025-03-06 RX ADMIN — ONDANSETRON 4 MG: 2 INJECTION INTRAMUSCULAR; INTRAVENOUS at 06:03

## 2025-03-06 RX ADMIN — CLONIDINE HYDROCHLORIDE 0.2 MG: 0.1 TABLET ORAL at 05:03

## 2025-03-06 RX ADMIN — HYDRALAZINE HYDROCHLORIDE 20 MG: 20 INJECTION INTRAMUSCULAR; INTRAVENOUS at 08:03

## 2025-03-06 RX ADMIN — HYDRALAZINE HYDROCHLORIDE 20 MG: 20 INJECTION INTRAMUSCULAR; INTRAVENOUS at 05:03

## 2025-03-06 RX ADMIN — MORPHINE SULFATE 4 MG: 4 INJECTION, SOLUTION INTRAMUSCULAR; INTRAVENOUS at 06:03

## 2025-03-06 RX ADMIN — CLONIDINE HYDROCHLORIDE 0.3 MG: 0.1 TABLET ORAL at 06:03

## 2025-03-06 RX ADMIN — HYDROCODONE BITARTRATE AND ACETAMINOPHEN 1 TABLET: 5; 325 TABLET ORAL at 10:03

## 2025-03-06 RX ADMIN — HYDRALAZINE HYDROCHLORIDE 10 MG: 20 INJECTION INTRAMUSCULAR; INTRAVENOUS at 06:03

## 2025-03-06 RX ADMIN — ACETAMINOPHEN 1000 MG: 500 TABLET ORAL at 08:03

## 2025-03-06 RX ADMIN — NITROGLYCERIN 2 INCH: 20 OINTMENT TOPICAL at 08:03

## 2025-03-06 RX ADMIN — MINOXIDIL 2.5 MG: 2.5 TABLET ORAL at 09:03

## 2025-03-06 NOTE — ED PROVIDER NOTES
Encounter Date: 3/6/2025    SCRIBE #1 NOTE: I, Jessica Epperson, am scribing for, and in the presence of,  Artur Diallo DO.       History     Chief Complaint   Patient presents with    Hypertension    Headache     This 45 y.o. female pt presents to the ED via EMS with c/o HTN,and Headache. The EMS reported they put up the pt from dialysis and she was experiencing headaches, SOB with rachelle blood pressure. The pt's Blood pressures when picked up from dialysis was 266/116. En route to the ED pt's BP was 274/106 after given medications in the Ambulance. Pt has Mhx of HTN, and DM. Pt is on 3 HTN medications. Pt has swelling on the right ankle from a spider bite over 2 years ago.     The history is provided by the patient and the EMS personnel. No  was used.     Review of patient's allergies indicates:   Allergen Reactions    Pseudoephedrine Shortness Of Breath and Other (See Comments)    Ampicillin     Penicillins     Sudafed cold-allergy      Past Medical History:   Diagnosis Date    Diabetes mellitus     Dialysis patient     Hypertension     Nerve pain     Renal disorder      Past Surgical History:   Procedure Laterality Date    AV FISTULA PLACEMENT Left      No family history on file.  Social History[1]  Review of Systems   Constitutional:  Negative for fever.   HENT:  Negative for congestion.    Respiratory:  Positive for shortness of breath.    Cardiovascular:  Negative for chest pain and leg swelling.   Gastrointestinal:  Negative for abdominal pain, nausea and vomiting.   Endocrine:        Pt has DM.   Musculoskeletal:  Positive for gait problem. Negative for back pain.   Neurological:  Positive for headaches.   Psychiatric/Behavioral:  Negative for agitation and behavioral problems.        Physical Exam     Initial Vitals [03/06/25 1647]   BP Pulse Resp Temp SpO2   (!) 259/107 63 16 97.8 °F (36.6 °C) (!) 92 %      MAP       --         Physical Exam    Constitutional: She appears  well-developed and well-nourished.   HENT:   Head: Normocephalic and atraumatic.   Right Ear: External ear normal.   Left Ear: External ear normal.   Nose: Nose normal. Mouth/Throat: Oropharynx is clear and moist.   Eyes: Conjunctivae and EOM are normal. Pupils are equal, round, and reactive to light.   Neck: Neck supple.   Normal range of motion.  Cardiovascular:  Normal rate, regular rhythm, normal heart sounds and intact distal pulses.           Pulmonary/Chest: Breath sounds normal.   Abdominal: Abdomen is soft. Bowel sounds are normal.   Genitourinary:    Vagina and uterus normal.     Musculoskeletal:         General: Edema present. Normal range of motion.      Cervical back: Normal range of motion and neck supple.      Comments: Pt has swollen right ankle.     Neurological: She is alert and oriented to person, place, and time. She has normal strength and normal reflexes.   Skin: Skin is warm. Capillary refill takes less than 2 seconds.   Psychiatric: She has a normal mood and affect. Her behavior is normal. Judgment and thought content normal.         ED Course   Procedures  Labs Reviewed   COMPREHENSIVE METABOLIC PANEL - Abnormal       Result Value    Sodium 136      Potassium 4.9      Chloride 94 (*)     CO2 23      Anion Gap 24 (*)     Glucose 79      BUN 49 (*)     Creatinine 6.46 (*)     BUN/Creatinine Ratio 8      Calcium 9.7      Total Protein 6.3 (*)     Albumin 2.9 (*)     Globulin 3.4      A/G Ratio 0.9      Bilirubin, Total 0.4      Alk Phos 148      ALT <7      AST 24      eGFR 8 (*)    TROPONIN I - Abnormal    Troponin I High Sensitivity 48.3 (*)    TROPONIN I - Abnormal    Troponin I High Sensitivity 49.4 (*)    NT-PRO NATRIURETIC PEPTIDE - Abnormal    ProBNP 287,870 (*)    CBC WITH DIFFERENTIAL - Abnormal    WBC 3.78 (*)     RBC 3.66 (*)     Hemoglobin 11.8 (*)     Hematocrit 37.4 (*)     .2 (*)     MCH 32.2 (*)     MCHC 31.6 (*)     RDW 16.3 (*)     Platelet Count 95 (*)     MPV 11.2       Neutrophils % 59.7      Lymphocytes % 22.2 (*)     Monocytes % 9.3 (*)     Eosinophils % 6.9 (*)     Basophils % 1.1 (*)     Immature Granulocytes % 0.8 (*)     nRBC, Auto 0.0      Neutrophils, Abs 2.26      Lymphocytes, Absolute 0.84 (*)     Monocytes, Absolute 0.35      Eosinophils, Absolute 0.26      Basophils, Absolute 0.04      Immature Granulocytes, Absolute 0.03      nRBC, Absolute 0.00      Diff Type Auto     CBC W/ AUTO DIFFERENTIAL    Narrative:     The following orders were created for panel order CBC auto differential.  Procedure                               Abnormality         Status                     ---------                               -----------         ------                     CBC with Differential[[id number]]       Abnormal            Final result                 Please view results for these tests on the individual orders.     EKG Readings: (Independently Interpreted)   Heart Rate: 63 bpm.   Sinus rhythm  Possible left atrial abnormality  Left ventricular hypertrophy  Inferior/lateral ST-T abnormality may be due to the hypertrophy and /or ischemia  Abnormal ECG       Imaging Results    None          Medications   hydrALAZINE injection 20 mg (20 mg Intravenous Given 3/6/25 1702)   cloNIDine tablet 0.2 mg (0.2 mg Oral Given 3/6/25 1703)   morphine injection 4 mg (4 mg Intravenous Given 3/6/25 1824)   ondansetron injection 4 mg (4 mg Intravenous Given 3/6/25 1824)   hydrALAZINE injection 10 mg (10 mg Intravenous Given 3/6/25 1823)   cloNIDine tablet 0.3 mg (0.3 mg Oral Given 3/6/25 1824)   hydrALAZINE injection 20 mg (20 mg Intravenous Given 3/6/25 2021)   nitroGLYCERIN 2% TD oint ointment 2 inch (2 inches Topical (Top) Given 3/6/25 2020)   acetaminophen tablet 1,000 mg (1,000 mg Oral Given 3/6/25 2021)   HYDROcodone-acetaminophen 5-325 mg per tablet 1 tablet (1 tablet Oral Given 3/6/25 2208)     Medical Decision Making  A 45-year-old female patient came to the emergency department from  dialysis with headache and hypertension.  The patient given multiple medication to control her pressure.  Her pressure improved .  We will discharge her home        Attending Attestation:           Physician Attestation for Scribe:  Physician Attestation Statement for Scribe #1: I, Artur Diallo, DO, reviewed documentation, as scribed by Jessica Epperson in my presence, and it is both accurate and complete.                                    Clinical Impression:  Final diagnoses:  [R07.9] Chest pain  [I16.0] Hypertensive urgency (Primary)          ED Disposition Condition    Discharge Stable          ED Prescriptions       Medication Sig Dispense Start Date End Date Auth. Provider    minoxidiL (LONITEN) 2.5 MG tablet Take 1 tablet (2.5 mg total) by mouth once daily. 30 tablet 3/6/2025 3/6/2026 Westley Camacho MD          Follow-up Information    None              [1]   Social History  Tobacco Use    Smoking status: Every Day     Current packs/day: 0.50     Types: Cigarettes    Smokeless tobacco: Never   Substance Use Topics    Drug use: Never        Westley Camacho MD  03/09/25 8452

## 2025-03-07 ENCOUNTER — TELEPHONE (OUTPATIENT)
Dept: EMERGENCY MEDICINE | Facility: HOSPITAL | Age: 45
End: 2025-03-07
Payer: MEDICARE

## 2025-09-03 ENCOUNTER — TELEPHONE (OUTPATIENT)
Dept: VASCULAR SURGERY | Facility: CLINIC | Age: 45
End: 2025-09-03
Payer: MEDICARE